# Patient Record
Sex: MALE | Race: BLACK OR AFRICAN AMERICAN | NOT HISPANIC OR LATINO | Employment: FULL TIME | URBAN - METROPOLITAN AREA
[De-identification: names, ages, dates, MRNs, and addresses within clinical notes are randomized per-mention and may not be internally consistent; named-entity substitution may affect disease eponyms.]

---

## 2019-03-04 ENCOUNTER — OFFICE VISIT (OUTPATIENT)
Dept: INTERNAL MEDICINE | Facility: MEDICAL CENTER | Age: 39
End: 2019-03-04
Payer: COMMERCIAL

## 2019-03-04 ENCOUNTER — HOSPITAL ENCOUNTER (OUTPATIENT)
Dept: LAB | Facility: MEDICAL CENTER | Age: 39
End: 2019-03-04
Attending: STUDENT IN AN ORGANIZED HEALTH CARE EDUCATION/TRAINING PROGRAM
Payer: COMMERCIAL

## 2019-03-04 VITALS
HEIGHT: 68 IN | TEMPERATURE: 98.1 F | SYSTOLIC BLOOD PRESSURE: 108 MMHG | HEART RATE: 68 BPM | BODY MASS INDEX: 28.89 KG/M2 | RESPIRATION RATE: 20 BRPM | WEIGHT: 190.6 LBS | OXYGEN SATURATION: 93 % | DIASTOLIC BLOOD PRESSURE: 60 MMHG

## 2019-03-04 DIAGNOSIS — Z00.00 ENCOUNTER FOR HEALTH MAINTENANCE EXAMINATION IN ADULT: ICD-10-CM

## 2019-03-04 DIAGNOSIS — Z57.9 MILD INTERMITTENT OCCUPATIONAL ASTHMA WITHOUT COMPLICATION: ICD-10-CM

## 2019-03-04 DIAGNOSIS — J45.20 MILD INTERMITTENT OCCUPATIONAL ASTHMA WITHOUT COMPLICATION: ICD-10-CM

## 2019-03-04 PROBLEM — J45.909 OCCUPATIONAL ASTHMA WITHOUT COMPLICATION: Status: ACTIVE | Noted: 2019-03-04

## 2019-03-04 LAB
ALBUMIN SERPL BCP-MCNC: 4.5 G/DL (ref 3.2–4.9)
ALBUMIN/GLOB SERPL: 1.4 G/DL
ALP SERPL-CCNC: 57 U/L (ref 30–99)
ALT SERPL-CCNC: 23 U/L (ref 2–50)
ANION GAP SERPL CALC-SCNC: 4 MMOL/L (ref 0–11.9)
AST SERPL-CCNC: 21 U/L (ref 12–45)
BILIRUB SERPL-MCNC: 0.5 MG/DL (ref 0.1–1.5)
BUN SERPL-MCNC: 16 MG/DL (ref 8–22)
CALCIUM SERPL-MCNC: 9.9 MG/DL (ref 8.5–10.5)
CHLORIDE SERPL-SCNC: 103 MMOL/L (ref 96–112)
CHOLEST SERPL-MCNC: 140 MG/DL (ref 100–199)
CO2 SERPL-SCNC: 29 MMOL/L (ref 20–33)
CREAT SERPL-MCNC: 1.17 MG/DL (ref 0.5–1.4)
ERYTHROCYTE [DISTWIDTH] IN BLOOD BY AUTOMATED COUNT: 42.2 FL (ref 35.9–50)
FASTING STATUS PATIENT QL REPORTED: NORMAL
GLOBULIN SER CALC-MCNC: 3.2 G/DL (ref 1.9–3.5)
GLUCOSE SERPL-MCNC: 82 MG/DL (ref 65–99)
HCT VFR BLD AUTO: 49.9 % (ref 42–52)
HDLC SERPL-MCNC: 49 MG/DL
HGB BLD-MCNC: 16.9 G/DL (ref 14–18)
LDLC SERPL CALC-MCNC: 80 MG/DL
MCH RBC QN AUTO: 31.5 PG (ref 27–33)
MCHC RBC AUTO-ENTMCNC: 33.9 G/DL (ref 33.7–35.3)
MCV RBC AUTO: 93.1 FL (ref 81.4–97.8)
PLATELET # BLD AUTO: 206 K/UL (ref 164–446)
PMV BLD AUTO: 9.9 FL (ref 9–12.9)
POTASSIUM SERPL-SCNC: 4.2 MMOL/L (ref 3.6–5.5)
PROT SERPL-MCNC: 7.7 G/DL (ref 6–8.2)
RBC # BLD AUTO: 5.36 M/UL (ref 4.7–6.1)
SODIUM SERPL-SCNC: 136 MMOL/L (ref 135–145)
TRIGL SERPL-MCNC: 55 MG/DL (ref 0–149)
TSH SERPL DL<=0.005 MIU/L-ACNC: 0.79 UIU/ML (ref 0.38–5.33)
WBC # BLD AUTO: 3.9 K/UL (ref 4.8–10.8)

## 2019-03-04 PROCEDURE — 80053 COMPREHEN METABOLIC PANEL: CPT

## 2019-03-04 PROCEDURE — 99204 OFFICE O/P NEW MOD 45 MIN: CPT | Mod: GC | Performed by: INTERNAL MEDICINE

## 2019-03-04 PROCEDURE — 36415 COLL VENOUS BLD VENIPUNCTURE: CPT

## 2019-03-04 PROCEDURE — 84443 ASSAY THYROID STIM HORMONE: CPT

## 2019-03-04 PROCEDURE — 85027 COMPLETE CBC AUTOMATED: CPT

## 2019-03-04 PROCEDURE — 80061 LIPID PANEL: CPT

## 2019-03-04 RX ORDER — ALBUTEROL SULFATE 90 UG/1
2 AEROSOL, METERED RESPIRATORY (INHALATION) EVERY 6 HOURS PRN
Qty: 8.5 G | Refills: 4 | Status: SHIPPED | OUTPATIENT
Start: 2019-03-04

## 2019-03-04 SDOH — HEALTH STABILITY - PHYSICAL HEALTH: OCCUPATIONAL EXPOSURE TO UNSPECIFIED RISK FACTOR: Z57.9

## 2019-03-04 NOTE — PATIENT INSTRUCTIONS
Asthma Attack Prevention  Although there is no way to prevent asthma from starting, you can take steps to control the disease and reduce its symptoms. Learn about your asthma and how to control it. Take an active role to control your asthma by working with your health care provider to create and follow an asthma action plan. An asthma action plan guides you in:  · Taking your medicines properly.  · Avoiding things that set off your asthma or make your asthma worse (asthma triggers).  · Tracking your level of asthma control.  · Responding to worsening asthma.  · Seeking emergency care when needed.  To track your asthma, keep records of your symptoms, check your peak flow number using a handheld device that shows how well air moves out of your lungs (peak flow meter), and get regular asthma checkups.   WHAT ARE SOME WAYS TO PREVENT AN ASTHMA ATTACK?  · Take medicines as directed by your health care provider.  · Keep track of your asthma symptoms and level of control.  · With your health care provider, write a detailed plan for taking medicines and managing an asthma attack. Then be sure to follow your action plan. Asthma is an ongoing condition that needs regular monitoring and treatment.  · Identify and avoid asthma triggers. Many outdoor allergens and irritants (such as pollen, mold, cold air, and air pollution) can trigger asthma attacks. Find out what your asthma triggers are and take steps to avoid them.  · Monitor your breathing. Learn to recognize warning signs of an attack, such as coughing, wheezing, or shortness of breath. Your lung function may decrease before you notice any signs or symptoms, so regularly measure and record your peak airflow with a home peak flow meter.  · Identify and treat attacks early. If you act quickly, you are less likely to have a severe attack. You will also need less medicine to control your symptoms. When your peak flow measurements decrease and alert you to an upcoming attack,  take your medicine as instructed and immediately stop any activity that may have triggered the attack. If your symptoms do not improve, get medical help.  · Pay attention to increasing quick-relief inhaler use. If you find yourself relying on your quick-relief inhaler, your asthma is not under control. See your health care provider about adjusting your treatment.  WHAT CAN MAKE MY SYMPTOMS WORSE?  A number of common things can set off or make your asthma symptoms worse and cause temporary increased inflammation of your airways. Keep track of your asthma symptoms for several weeks, detailing all the environmental and emotional factors that are linked with your asthma. When you have an asthma attack, go back to your asthma diary to see which factor, or combination of factors, might have contributed to it. Once you know what these factors are, you can take steps to control many of them. If you have allergies and asthma, it is important to take asthma prevention steps at home. Minimizing contact with the substance to which you are allergic will help prevent an asthma attack. Some triggers and ways to avoid these triggers are:  Animal Dander:   Some people are allergic to the flakes of skin or dried saliva from animals with fur or feathers.   · There is no such thing as a hypoallergenic dog or cat breed. All dogs or cats can cause allergies, even if they don't shed.  · Keep these pets out of your home.  · If you are not able to keep a pet outdoors, keep the pet out of your bedroom and other sleeping areas at all times, and keep the door closed.  · Remove carpets and furniture covered with cloth from your home. If that is not possible, keep the pet away from fabric-covered furniture and carpets.  Dust Mites:  Many people with asthma are allergic to dust mites. Dust mites are tiny bugs that are found in every home in mattresses, pillows, carpets, fabric-covered furniture, bedcovers, clothes, stuffed toys, and other  fabric-covered items.   · Cover your mattress in a special dust-proof cover.  · Cover your pillow in a special dust-proof cover, or wash the pillow each week in hot water. Water must be hotter than 130° F (54.4° C) to kill dust mites. Cold or warm water used with detergent and bleach can also be effective.  · Wash the sheets and blankets on your bed each week in hot water.  · Try not to sleep or lie on cloth-covered cushions.  · Call ahead when traveling and ask for a smoke-free hotel room. Bring your own bedding and pillows in case the hotel only supplies feather pillows and down comforters, which may contain dust mites and cause asthma symptoms.  · Remove carpets from your bedroom and those laid on concrete, if you can.  · Keep stuffed toys out of the bed, or wash the toys weekly in hot water or cooler water with detergent and bleach.  Cockroaches:  Many people with asthma are allergic to the droppings and remains of cockroaches.   · Keep food and garbage in closed containers. Never leave food out.  · Use poison baits, traps, powders, gels, or paste (for example, boric acid).  · If a spray is used to kill cockroaches, stay out of the room until the odor goes away.  Indoor Mold:  · Fix leaky faucets, pipes, or other sources of water that have mold around them.  · Clean floors and moldy surfaces with a fungicide or diluted bleach.  · Avoid using humidifiers, vaporizers, or swamp coolers. These can spread molds through the air.  Pollen and Outdoor Mold:  · When pollen or mold spore counts are high, try to keep your windows closed.  · Stay indoors with windows closed from late morning to afternoon. Pollen and some mold spore counts are highest at that time.  · Ask your health care provider whether you need to take anti-inflammatory medicine or increase your dose of the medicine before your allergy season starts.  Other Irritants to Avoid:  · Tobacco smoke is an irritant. If you smoke, ask your health care provider how  you can quit. Ask family members to quit smoking, too. Do not allow smoking in your home or car.  · If possible, do not use a wood-burning stove, kerosene heater, or fireplace. Minimize exposure to all sources of smoke, including incense, candles, fires, and fireworks.  · Try to stay away from strong odors and sprays, such as perfume, talcum powder, hair spray, and paints.  · Decrease humidity in your home and use an indoor air cleaning device. Reduce indoor humidity to below 60%. Dehumidifiers or central air conditioners can do this.  · Decrease house dust exposure by changing furnace and air cooler filters frequently.  · Try to have someone else vacuum for you once or twice a week. Stay out of rooms while they are being vacuumed and for a short while afterward.  · If you vacuum, use a dust mask from a hardware store, a double-layered or microfilter vacuum  bag, or a vacuum  with a HEPA filter.  · Sulfites in foods and beverages can be irritants. Do not drink beer or wine or eat dried fruit, processed potatoes, or shrimp if they cause asthma symptoms.  · Cold air can trigger an asthma attack. Cover your nose and mouth with a scarf on cold or windy days.  · Several health conditions can make asthma more difficult to manage, including a runny nose, sinus infections, reflux disease, psychological stress, and sleep apnea. Work with your health care provider to manage these conditions.  · Avoid close contact with people who have a respiratory infection such as a cold or the flu, since your asthma symptoms may get worse if you catch the infection. Wash your hands thoroughly after touching items that may have been handled by people with a respiratory infection.  · Get a flu shot every year to protect against the flu virus, which often makes asthma worse for days or weeks. Also get a pneumonia shot if you have not previously had one. Unlike the flu shot, the pneumonia shot does not need to be given  yearly.  Medicines:  · Talk to your health care provider about whether it is safe for you to take aspirin or non-steroidal anti-inflammatory medicines (NSAIDs). In a small number of people with asthma, aspirin and NSAIDs can cause asthma attacks. These medicines must be avoided by people who have known aspirin-sensitive asthma. It is important that people with aspirin-sensitive asthma read labels of all over-the-counter medicines used to treat pain, colds, coughs, and fever.  · Beta-blockers and ACE inhibitors are other medicines you should discuss with your health care provider.  HOW CAN I FIND OUT WHAT I AM ALLERGIC TO?  Ask your asthma health care provider about allergy skin testing or blood testing (the RAST test) to identify the allergens to which you are sensitive. If you are found to have allergies, the most important thing to do is to try to avoid exposure to any allergens that you are sensitive to as much as possible. Other treatments for allergies, such as medicines and allergy shots (immunotherapy) are available.   CAN I EXERCISE?  Follow your health care provider's advice regarding asthma treatment before exercising. It is important to maintain a regular exercise program, but vigorous exercise or exercise in cold, humid, or dry environments can cause asthma attacks, especially for those people who have exercise-induced asthma.     This information is not intended to replace advice given to you by your health care provider. Make sure you discuss any questions you have with your health care provider.     Document Released: 12/06/2010 Document Revised: 12/23/2014 Document Reviewed: 06/25/2014  [employer] Interactive Patient Education ©2016 [employer]

## 2019-03-04 NOTE — PROGRESS NOTES
New Patient to Establish    Reason to establish: New patient to establish    CC: Establish care and asthma-like symptoms    HPI: South is a 38-year-old male recently, patient works at the [employer].  Patient's wife is a nurse practitioner.  Patient has no past medical history but goes on to describe having stress-induced shortness of breath.  On further explanation, that patient describes that he develops shortness of breath particularly at work.  He also notices shortness of breath that develops around pattie environments or smoking.  Patient reports having one exacerbation once every 4 months.  Albuterol resolves all of his symptoms.  Patient has not had pulmonary function test recently.    Patient Active Problem List    Diagnosis Date Noted   • Occupational asthma without complication 03/04/2019       History reviewed. No pertinent past medical history.    Current Outpatient Prescriptions   Medication Sig Dispense Refill   • albuterol 108 (90 Base) MCG/ACT Aero Soln inhalation aerosol Inhale 2 Puffs by mouth every 6 hours as needed for Shortness of Breath. 8.5 g 4     No current facility-administered medications for this visit.        Allergies as of 03/04/2019   • (Not on File)       Social History     Social History   • Marital status:      Spouse name: N/A   • Number of children: N/A   • Years of education: N/A     Occupational History   • Not on file.     Social History Main Topics   • Smoking status: Never Smoker   • Smokeless tobacco: Never Used   • Alcohol use No   • Drug use: No   • Sexual activity: Yes     Partners: Female     Birth control/ protection: Implant     Other Topics Concern   • Not on file     Social History Narrative   • No narrative on file       Family History   Problem Relation Age of Onset   • Diabetes Father        History reviewed. No pertinent surgical history.    ROS: As per HPI. Additional pertinent systems as noted below.  Constitutional: Negative for chills and fever.  "  HENT: Negative for ear pain and sore throat.    Eyes: Negative for discharge and redness.   Respiratory: Negative for cough, hemoptysis, wheezing and stridor.    Cardiovascular: Negative for chest pain, palpitations and leg swelling.   Gastrointestinal: Negative for abdominal pain, constipation, diarrhea, heartburn, nausea and vomiting.   Genitourinary: Negative for dysuria, flank pain and hematuria.   Musculoskeletal: Negative for falls and myalgias.   Skin: Negative for itching and rash.   Neurological: Negative for dizziness, seizures, loss of consciousness and headaches.   Endo/Heme/Allergies: Negative for polydipsia. Does not bruise/bleed easily.   Psychiatric/Behavioral: Negative for substance abuse and suicidal ideas.       /60 (BP Location: Left arm, Patient Position: Sitting, BP Cuff Size: Adult)   Pulse 68   Temp 36.7 °C (98.1 °F) (Temporal)   Resp 20   Ht 1.735 m (5' 8.31\")   Wt 86.5 kg (190 lb 9.6 oz)   SpO2 93%   BMI 28.72 kg/m²     Physical Exam  General:  Alert and oriented, No apparent distress.    Eyes: Pupils equal and reactive. No scleral icterus.    Throat: Clear no erythema or exudates noted.    Neck: Supple. No lymphadenopathy noted. Thyroid not enlarged.    Lungs: Clear to auscultation and percussion bilaterally.    Cardiovascular: Regular rate and rhythm. No murmurs, rubs or gallops.    Abdomen:  Benign. No rebound or guarding noted.    Extremities: No clubbing, cyanosis, edema.    Skin: Clear. No rash or suspicious skin lesions noted.      Note: I have reviewed all pertinent labs and diagnostic tests associated with this visit with specific comments listed under the assessment and plan below    Assessment and Plan    1. Mild intermittent occupational asthma without complication  Description of patient's symptoms are consistent with occupational asthma.  Patient currently works at [employer].  Plan  - CBC WITHOUT DIFFERENTIAL; Future  - Comp Metabolic Panel; Future  - Lipid Profile; " Future  - TSH WITH REFLEX TO FT4; Future  - PULMONARY FUNCTION TESTS -Test requested: Bronchopulmonary Challenge Testing; Future    2. Encounter for health maintenance examination in adult  Patient is here to establish care, patient is up-to-date and all of his immunizations.  Plan  - CBC WITHOUT DIFFERENTIAL; Future  - Comp Metabolic Panel; Future  - Lipid Profile; Future  - TSH WITH REFLEX TO FT4; Future      Followup: Return in about 10 weeks (around 5/13/2019), or if symptoms worsen or fail to improve, for Short.    Signed by: Job Piper M.D.

## 2019-03-15 ENCOUNTER — TELEPHONE (OUTPATIENT)
Dept: PULMONOLOGY | Facility: HOSPICE | Age: 39
End: 2019-03-15

## 2019-03-15 DIAGNOSIS — J45.909 OCCUPATIONAL ASTHMA WITHOUT COMPLICATION, UNSPECIFIED ASTHMA SEVERITY: ICD-10-CM

## 2019-03-15 DIAGNOSIS — Z57.9 OCCUPATIONAL ASTHMA WITHOUT COMPLICATION, UNSPECIFIED ASTHMA SEVERITY: ICD-10-CM

## 2019-03-15 SDOH — HEALTH STABILITY - PHYSICAL HEALTH: OCCUPATIONAL EXPOSURE TO UNSPECIFIED RISK FACTOR: Z57.9

## 2019-03-15 NOTE — TELEPHONE ENCOUNTER
Dr. Piper, pt is scheduled for complete PFT 3/19/19. This test must be completed prior to scheduling Bronchopulmonary challenge testing.  Please enter an order for complete PFT.  Thanks for your time and attention, please call if you have any questions. Suly ext [phone].

## 2019-03-19 ENCOUNTER — APPOINTMENT (OUTPATIENT)
Dept: PULMONOLOGY | Facility: HOSPICE | Age: 39
End: 2019-03-19
Attending: STUDENT IN AN ORGANIZED HEALTH CARE EDUCATION/TRAINING PROGRAM
Payer: COMMERCIAL

## 2019-04-01 ENCOUNTER — NON-PROVIDER VISIT (OUTPATIENT)
Dept: PULMONOLOGY | Facility: HOSPICE | Age: 39
End: 2019-04-01
Attending: STUDENT IN AN ORGANIZED HEALTH CARE EDUCATION/TRAINING PROGRAM
Payer: COMMERCIAL

## 2019-04-01 VITALS — HEIGHT: 68 IN | WEIGHT: 191 LBS | BODY MASS INDEX: 28.95 KG/M2

## 2019-04-01 DIAGNOSIS — Z57.9 OCCUPATIONAL ASTHMA WITHOUT COMPLICATION, UNSPECIFIED ASTHMA SEVERITY: ICD-10-CM

## 2019-04-01 DIAGNOSIS — Z00.00 ENCOUNTER FOR HEALTH MAINTENANCE EXAMINATION IN ADULT: ICD-10-CM

## 2019-04-01 DIAGNOSIS — J45.20 MILD INTERMITTENT OCCUPATIONAL ASTHMA WITHOUT COMPLICATION: ICD-10-CM

## 2019-04-01 DIAGNOSIS — J45.909 OCCUPATIONAL ASTHMA WITHOUT COMPLICATION, UNSPECIFIED ASTHMA SEVERITY: ICD-10-CM

## 2019-04-01 DIAGNOSIS — Z57.9 MILD INTERMITTENT OCCUPATIONAL ASTHMA WITHOUT COMPLICATION: ICD-10-CM

## 2019-04-01 PROCEDURE — 94060 EVALUATION OF WHEEZING: CPT | Performed by: INTERNAL MEDICINE

## 2019-04-01 PROCEDURE — 94729 DIFFUSING CAPACITY: CPT | Performed by: INTERNAL MEDICINE

## 2019-04-01 PROCEDURE — 94726 PLETHYSMOGRAPHY LUNG VOLUMES: CPT | Performed by: INTERNAL MEDICINE

## 2019-04-01 SDOH — HEALTH STABILITY - PHYSICAL HEALTH: OCCUPATIONAL EXPOSURE TO UNSPECIFIED RISK FACTOR: Z57.9

## 2019-04-01 ASSESSMENT — PULMONARY FUNCTION TESTS
FVC: 4.46
FVC: 4.25
FEV1/FVC: 80
FEV1/FVC_PREDICTED: 82
FEV1/FVC_PERCENT_CHANGE: 6
FEV1/FVC_PERCENT_CHANGE: -25
FEV1/FVC: 85
FEV1_PERCENT_CHANGE: -4
FVC_PERCENT_PREDICTED: 103
FEV1/FVC_PERCENT_LLN: 68
FEV1_PERCENT_PREDICTED: 106
FEV1_PERCENT_PREDICTED: 107
FVC_PERCENT_PREDICTED: 108
FEV1_LLN: 2.82
FEV1/FVC_PERCENT_PREDICTED: 104
FEV1/FVC_PERCENT_PREDICTED: 104
FEV1/FVC: 85.41
FEV1_PERCENT_CHANGE: 1
FEV1_PREDICTED: 3.38
FEV1/FVC_PERCENT_PREDICTED: 98
FEV1: 3.63
FVC_LLN: 3.43
FEV1/FVC_PERCENT_PREDICTED: 97
FVC_PREDICTED: 4.11
FEV1/FVC_PERCENT_PREDICTED: 82
FEV1/FVC: 80
FEV1: 3.58

## 2019-04-01 NOTE — PROCEDURES
Technician: JULIANA Huitron    Technician Comment:  Good patient effort & cooperation.  The results of this test meet the ATS/ERS standards for acceptability & reproducibility.  Test was performed on the Nexi Body Plethysmograph-Elite DX system.  Predicted values were [facility]-3 for spirometry, [facility] for DLCO, ITS for Lung Volumes.  The DLCO was uncorrected for Hgb.  A bronchodilator of Ventolin HFA -2puffs via spacer administered.  DLCO performed during dilation period.    Interpretation:    Spirometry showed FVC of 4.25 L, 103% of predicted.  FEV1 was also normal at 3.63 L, 107% of predicted.  FEV1/FVC ratio was normal at 80%.  There was no significant response to bronchodilators.    Lung volumes showed normal residual volume at 103% of predicted and total lung capacity was also normal at 93% of predicted    Diffusion capacity was normal at 120% of predicted    Impression:  The patient had normal pulmonary function test.  Clinical correlation is required.

## 2019-05-13 ENCOUNTER — OFFICE VISIT (OUTPATIENT)
Dept: INTERNAL MEDICINE | Facility: MEDICAL CENTER | Age: 39
End: 2019-05-13
Payer: COMMERCIAL

## 2019-05-13 VITALS
OXYGEN SATURATION: 98 % | HEIGHT: 68 IN | TEMPERATURE: 98 F | SYSTOLIC BLOOD PRESSURE: 114 MMHG | HEART RATE: 67 BPM | WEIGHT: 188.25 LBS | BODY MASS INDEX: 28.53 KG/M2 | DIASTOLIC BLOOD PRESSURE: 64 MMHG

## 2019-05-13 DIAGNOSIS — J45.20 MILD INTERMITTENT OCCUPATIONAL ASTHMA WITHOUT COMPLICATION: ICD-10-CM

## 2019-05-13 DIAGNOSIS — Z57.9 MILD INTERMITTENT OCCUPATIONAL ASTHMA WITHOUT COMPLICATION: ICD-10-CM

## 2019-05-13 DIAGNOSIS — J45.990 EXERCISE-INDUCED ASTHMA: ICD-10-CM

## 2019-05-13 PROCEDURE — 99213 OFFICE O/P EST LOW 20 MIN: CPT | Mod: GE | Performed by: INTERNAL MEDICINE

## 2019-05-13 SDOH — HEALTH STABILITY - PHYSICAL HEALTH: OCCUPATIONAL EXPOSURE TO UNSPECIFIED RISK FACTOR: Z57.9

## 2019-05-13 ASSESSMENT — PATIENT HEALTH QUESTIONNAIRE - PHQ9: CLINICAL INTERPRETATION OF PHQ2 SCORE: 0

## 2019-05-13 NOTE — PATIENT INSTRUCTIONS
Exercise-Induced Bronchospasm  A bronchospasm is a condition that is commonly caused by exercise, in which the muscles around the bronchioles (airways to the lungs) tighten, causing the airway to constrict. Exercise-induced bronchospasms are usually associated with short periods of vigorous activity. Many people who experience an exercise-induced bronchospasm may not notice at the time of the event; however, the athlete may later experience symptoms that negatively affect training and performance.  SYMPTOMS   · High-pitched sounds with breathing (wheezing).  · Coughing.  · Increased work of breathing (dyspnea).  · Rapid breathing (hyperventilation).  · Chest pain.  · Symptoms occurring 4 to 6 hours after exercise is completed (late-phase reaction).  CAUSES   It is not known why certain individuals experience bronchospasms. Respiratory specialists currently think that the cool or dry air breathed in may cause damage to the lining of the bronchioles, which elicits an inflammatory response. The inflammation causes the airways to narrow and symptoms then occur.  RISK INCREASES WITH:  · Viral infections.  · Exercise in cold air.  · Exercise in dry conditions.  · Poor physical fitness.  · High-intensity exercise.  · No warm-up before play.  · Frequent exposure to substances that produce allergic reactions (allergens).  PREVENTION   · Improve conditioning.  · Treat allergies.  · Breathe warm air (cover mouth and nose with a towel or scarf).  · Warm up for an appropriate period of time before physical activity.  · Gradually decrease intensity (warm down) for an appropriate period of time after physical activity.  PROGNOSIS   Most people with exercise-induced asthma respond well to medication. Patients are typically prescribed an inhaler to treat bronchospasms. However, if symptoms persist despite treatment and continue to affect performance, individuals may need to consider avoiding activities that produce  symptoms.  RELATED COMPLICATIONS   · Decreased athletic performance.  · Inability to condition as well as expected.  · Side effects from medications.  TREATMENT   · Maintain physical fitness.  · Run with a scarf or towel over your mouth in cold, dry air.  · Complete at least 10 minutes of warm-up before high-intensity exercise.  · Warm down after play.  · Treat allergies.  MEDICATION  · The usual initial medication is an albuterol inhaler, which expands the constricted bronchioles.  · The second-line medication is inhaled corticosteroids, which reduce inflammation in the airway.  · Alternative medications included sodium cromoglycate and nedocromil inhalers.  · Long-acting medications such as salmeterol can also be used as second-line medications.  ACTIVITY   If medications are able to treat the offending symptoms, then no activity modification is required. If you know you will be training or competing in cold or dry climates take extra precautions to prevent symptoms.  DIET   No specific diet is recommended.   SEEK MEDICAL CARE IF:   · Greater than normal fatigue with exercise.  · Greater than normal difficulty breathing occurs with exercise.  · Increased wheezing with exercise.  · You appear to be breathing harder and faster than expected with training.  · Allergies appear to be uncontrollable.  · You experience chest pain with exercise.  This information is not intended to replace advice given to you by your health care provider. Make sure you discuss any questions you have with your health care provider.  Document Released: 12/18/2006 Document Revised: 01/08/2016 Document Reviewed: 08/24/2016  [employer] Interactive Patient Education © 2017 [employer]

## 2019-05-13 NOTE — PROGRESS NOTES
Established Patient    South presents today with the following:    CC: Follow-up for occupational asthma and exercise-induced shortness of breath    HPI: South is a 38-year-old male who presented initially complaining of shortness of breath, insulting agents were pattie conditions at his work.  Patient states that since then he no longer works in pattie conditions and his shortness of breath has improved.  Patient underwent PFTs which were normal.  Patient mentions not having any shortness of breath episodes except for one time when he ran during winter.  Patient was preparing for a 5K and went for a short run and noticed that he became short of breath, patient stated that this run was in the middle of winter.    Patient's lab results were reviewed with patient.  No abnormalities are noted.    Patient Active Problem List    Diagnosis Date Noted   • Occupational asthma without complication 03/04/2019       Current Outpatient Prescriptions   Medication Sig Dispense Refill   • albuterol 108 (90 Base) MCG/ACT Aero Soln inhalation aerosol Inhale 2 Puffs by mouth every 6 hours as needed for Shortness of Breath. (Patient not taking: Reported on 5/13/2019) 8.5 g 4     No current facility-administered medications for this visit.        ROS: As per HPI. Additional pertinent systems as noted below.  Constitutional: Negative for chills and fever.   HENT: Negative for ear pain and sore throat.    Eyes: Negative for discharge and redness.   Respiratory: Negative for cough, hemoptysis, wheezing and stridor.    Cardiovascular: Negative for chest pain, palpitations and leg swelling.   Gastrointestinal: Negative for abdominal pain, constipation, diarrhea, heartburn, nausea and vomiting.   Genitourinary: Negative for dysuria, flank pain and hematuria.   Musculoskeletal: Negative for falls and myalgias.   Skin: Negative for itching and rash.   Neurological: Negative for dizziness, seizures, loss of consciousness and headaches.  "  Endo/Heme/Allergies: Negative for polydipsia. Does not bruise/bleed easily.   Psychiatric/Behavioral: Negative for substance abuse and suicidal ideas.       /64 (BP Location: Left arm, Patient Position: Sitting, BP Cuff Size: Adult)   Pulse 67   Temp 36.7 °C (98 °F) (Temporal)   Ht 1.734 m (5' 8.25\")   Wt 85.4 kg (188 lb 4 oz)   SpO2 98%   BMI 28.41 kg/m²     Physical Exam   Constitutional:  oriented to person, place, and time. No distress.   Eyes: Pupils are equal, round, and reactive to light. No scleral icterus.  Neck: Neck supple. No thyromegaly present.   Cardiovascular: Normal rate, regular rhythm and normal heart sounds.  Exam reveals no gallop and no friction rub.  No murmur heard.  Pulmonary/Chest: Breath sounds normal. Chest wall is not dull to percussion.   Musculoskeletal:   no edema.   Lymphadenopathy: no cervical adenopathy  Neurological: alert and oriented to person, place, and time.   Skin: No cyanosis. Nails show no clubbing.      Note: I have reviewed all pertinent labs and diagnostic tests associated with this visit with specific comments listed under the assessment and plan below    Assessment and Plan    1. Mild intermittent occupational asthma without complication  2. Exercise-induced asthma  Patient was given handout on exercise-induced asthma, patient was also instructed to perform proper warm ups and if necessary to use albuterol prior to run or exercise.        Followup: Return in about 25 weeks (around 11/4/2019), or if symptoms worsen or fail to improve, for Long.      Signed by: Job Piper M.D.    "

## 2019-05-20 ENCOUNTER — OFFICE VISIT (OUTPATIENT)
Dept: URGENT CARE | Facility: CLINIC | Age: 39
End: 2019-05-20

## 2019-05-20 ENCOUNTER — NON-PROVIDER VISIT (OUTPATIENT)
Dept: URGENT CARE | Facility: CLINIC | Age: 39
End: 2019-05-20

## 2019-05-20 DIAGNOSIS — Z01.89 RESPIRATORY CLEARANCE EXAMINATION, ENCOUNTER FOR: ICD-10-CM

## 2019-05-20 PROCEDURE — 94375 RESPIRATORY FLOW VOLUME LOOP: CPT | Performed by: FAMILY MEDICINE

## 2019-05-20 PROCEDURE — 8916 PR CLEARANCE ONLY: Performed by: FAMILY MEDICINE

## 2019-05-20 NOTE — PROGRESS NOTES
South Sharp is a 38 y.o. male here for a non-provider visit for Mask Fit    If abnormal was an in office provider notified today (if so, indicate provider)? Yes  Routed to PCP? No

## 2019-05-20 NOTE — PROGRESS NOTES
South Sharp is a 38 y.o. male here for a non-provider visit for Resp. Clearance    If abnormal was an in office provider notified today (if so, indicate provider)? Yes  Routed to PCP? No

## 2022-08-18 ENCOUNTER — HOSPITAL ENCOUNTER (EMERGENCY)
Facility: MEDICAL CENTER | Age: 42
End: 2022-08-19
Attending: EMERGENCY MEDICINE
Payer: COMMERCIAL

## 2022-08-18 ENCOUNTER — APPOINTMENT (OUTPATIENT)
Dept: RADIOLOGY | Facility: MEDICAL CENTER | Age: 42
End: 2022-08-18
Attending: EMERGENCY MEDICINE
Payer: COMMERCIAL

## 2022-08-18 DIAGNOSIS — N20.1 URETEROLITHIASIS: ICD-10-CM

## 2022-08-18 DIAGNOSIS — K38.9 APPENDICOLITH: ICD-10-CM

## 2022-08-18 DIAGNOSIS — K46.9 ABDOMINAL HERNIA WITHOUT OBSTRUCTION AND WITHOUT GANGRENE, RECURRENCE NOT SPECIFIED, UNSPECIFIED HERNIA TYPE: ICD-10-CM

## 2022-08-18 DIAGNOSIS — R16.0 HEPATOMEGALY: ICD-10-CM

## 2022-08-18 LAB
ALBUMIN SERPL BCP-MCNC: 4.3 G/DL (ref 3.2–4.9)
ALBUMIN/GLOB SERPL: 1.3 G/DL
ALP SERPL-CCNC: 59 U/L (ref 30–99)
ALT SERPL-CCNC: 19 U/L (ref 2–50)
ANION GAP SERPL CALC-SCNC: 13 MMOL/L (ref 7–16)
APPEARANCE UR: ABNORMAL
AST SERPL-CCNC: 19 U/L (ref 12–45)
BACTERIA #/AREA URNS HPF: NEGATIVE /HPF
BASOPHILS # BLD AUTO: 0.8 % (ref 0–1.8)
BASOPHILS # BLD: 0.04 K/UL (ref 0–0.12)
BILIRUB SERPL-MCNC: 0.2 MG/DL (ref 0.1–1.5)
BILIRUB UR QL STRIP.AUTO: NEGATIVE
BUN SERPL-MCNC: 15 MG/DL (ref 8–22)
CALCIUM SERPL-MCNC: 9.2 MG/DL (ref 8.5–10.5)
CHLORIDE SERPL-SCNC: 106 MMOL/L (ref 96–112)
CO2 SERPL-SCNC: 21 MMOL/L (ref 20–33)
COLOR UR: YELLOW
CREAT SERPL-MCNC: 1.52 MG/DL (ref 0.5–1.4)
EOSINOPHIL # BLD AUTO: 0.27 K/UL (ref 0–0.51)
EOSINOPHIL NFR BLD: 5.3 % (ref 0–6.9)
EPI CELLS #/AREA URNS HPF: NEGATIVE /HPF
ERYTHROCYTE [DISTWIDTH] IN BLOOD BY AUTOMATED COUNT: 40.3 FL (ref 35.9–50)
GFR SERPLBLD CREATININE-BSD FMLA CKD-EPI: 58 ML/MIN/1.73 M 2
GLOBULIN SER CALC-MCNC: 3.3 G/DL (ref 1.9–3.5)
GLUCOSE SERPL-MCNC: 117 MG/DL (ref 65–99)
GLUCOSE UR STRIP.AUTO-MCNC: NEGATIVE MG/DL
HCT VFR BLD AUTO: 44.8 % (ref 42–52)
HGB BLD-MCNC: 15.8 G/DL (ref 14–18)
HYALINE CASTS #/AREA URNS LPF: ABNORMAL /LPF
IMM GRANULOCYTES # BLD AUTO: 0.01 K/UL (ref 0–0.11)
IMM GRANULOCYTES NFR BLD AUTO: 0.2 % (ref 0–0.9)
KETONES UR STRIP.AUTO-MCNC: 15 MG/DL
LEUKOCYTE ESTERASE UR QL STRIP.AUTO: ABNORMAL
LIPASE SERPL-CCNC: 32 U/L (ref 11–82)
LYMPHOCYTES # BLD AUTO: 2.57 K/UL (ref 1–4.8)
LYMPHOCYTES NFR BLD: 50.4 % (ref 22–41)
MCH RBC QN AUTO: 31.3 PG (ref 27–33)
MCHC RBC AUTO-ENTMCNC: 35.3 G/DL (ref 33.7–35.3)
MCV RBC AUTO: 88.7 FL (ref 81.4–97.8)
MICRO URNS: ABNORMAL
MONOCYTES # BLD AUTO: 0.46 K/UL (ref 0–0.85)
MONOCYTES NFR BLD AUTO: 9 % (ref 0–13.4)
NEUTROPHILS # BLD AUTO: 1.75 K/UL (ref 1.82–7.42)
NEUTROPHILS NFR BLD: 34.3 % (ref 44–72)
NITRITE UR QL STRIP.AUTO: NEGATIVE
NRBC # BLD AUTO: 0 K/UL
NRBC BLD-RTO: 0 /100 WBC
PH UR STRIP.AUTO: 6.5 [PH] (ref 5–8)
PLATELET # BLD AUTO: 193 K/UL (ref 164–446)
PMV BLD AUTO: 9.6 FL (ref 9–12.9)
POTASSIUM SERPL-SCNC: 4 MMOL/L (ref 3.6–5.5)
PROT SERPL-MCNC: 7.6 G/DL (ref 6–8.2)
PROT UR QL STRIP: NEGATIVE MG/DL
RBC # BLD AUTO: 5.05 M/UL (ref 4.7–6.1)
RBC # URNS HPF: >150 /HPF
RBC UR QL AUTO: ABNORMAL
SODIUM SERPL-SCNC: 140 MMOL/L (ref 135–145)
SP GR UR STRIP.AUTO: 1.03
UROBILINOGEN UR STRIP.AUTO-MCNC: 1 MG/DL
WBC # BLD AUTO: 5.1 K/UL (ref 4.8–10.8)
WBC #/AREA URNS HPF: ABNORMAL /HPF

## 2022-08-18 PROCEDURE — 700111 HCHG RX REV CODE 636 W/ 250 OVERRIDE (IP): Performed by: EMERGENCY MEDICINE

## 2022-08-18 PROCEDURE — 83690 ASSAY OF LIPASE: CPT

## 2022-08-18 PROCEDURE — 80053 COMPREHEN METABOLIC PANEL: CPT

## 2022-08-18 PROCEDURE — 96375 TX/PRO/DX INJ NEW DRUG ADDON: CPT

## 2022-08-18 PROCEDURE — 85025 COMPLETE CBC W/AUTO DIFF WBC: CPT

## 2022-08-18 PROCEDURE — 700111 HCHG RX REV CODE 636 W/ 250 OVERRIDE (IP)

## 2022-08-18 PROCEDURE — 99285 EMERGENCY DEPT VISIT HI MDM: CPT

## 2022-08-18 PROCEDURE — 96374 THER/PROPH/DIAG INJ IV PUSH: CPT

## 2022-08-18 PROCEDURE — 700105 HCHG RX REV CODE 258: Performed by: EMERGENCY MEDICINE

## 2022-08-18 PROCEDURE — 36415 COLL VENOUS BLD VENIPUNCTURE: CPT

## 2022-08-18 PROCEDURE — 81001 URINALYSIS AUTO W/SCOPE: CPT

## 2022-08-18 RX ORDER — ONDANSETRON 4 MG/1
4 TABLET, ORALLY DISINTEGRATING ORAL ONCE
Status: COMPLETED | OUTPATIENT
Start: 2022-08-18 | End: 2022-08-18

## 2022-08-18 RX ORDER — KETOROLAC TROMETHAMINE 30 MG/ML
15 INJECTION, SOLUTION INTRAMUSCULAR; INTRAVENOUS ONCE
Status: COMPLETED | OUTPATIENT
Start: 2022-08-18 | End: 2022-08-18

## 2022-08-18 RX ORDER — SODIUM CHLORIDE 9 MG/ML
1000 INJECTION, SOLUTION INTRAVENOUS ONCE
Status: COMPLETED | OUTPATIENT
Start: 2022-08-18 | End: 2022-08-19

## 2022-08-18 RX ORDER — MORPHINE SULFATE 4 MG/ML
4 INJECTION INTRAVENOUS ONCE
Status: COMPLETED | OUTPATIENT
Start: 2022-08-18 | End: 2022-08-18

## 2022-08-18 RX ADMIN — SODIUM CHLORIDE 1000 ML: 9 INJECTION, SOLUTION INTRAVENOUS at 23:15

## 2022-08-18 RX ADMIN — ONDANSETRON 4 MG: 4 TABLET, ORALLY DISINTEGRATING ORAL at 20:48

## 2022-08-18 RX ADMIN — KETOROLAC TROMETHAMINE 15 MG: 30 INJECTION, SOLUTION INTRAMUSCULAR at 23:15

## 2022-08-18 RX ADMIN — MORPHINE SULFATE 4 MG: 4 INJECTION INTRAVENOUS at 23:15

## 2022-08-18 NOTE — LETTER
South Sharp was seen and treated in our emergency department on 8/18/2022.  He may return to work on Wednesday August 24, 2022.      If you have any questions or concerns, please don't hesitate to call.      Michelle Fischer RN

## 2022-08-19 VITALS
HEIGHT: 68 IN | OXYGEN SATURATION: 97 % | HEART RATE: 70 BPM | WEIGHT: 190 LBS | TEMPERATURE: 98.6 F | SYSTOLIC BLOOD PRESSURE: 114 MMHG | RESPIRATION RATE: 15 BRPM | DIASTOLIC BLOOD PRESSURE: 66 MMHG | BODY MASS INDEX: 28.79 KG/M2

## 2022-08-19 PROCEDURE — 74176 CT ABD & PELVIS W/O CONTRAST: CPT

## 2022-08-19 PROCEDURE — 700102 HCHG RX REV CODE 250 W/ 637 OVERRIDE(OP): Performed by: EMERGENCY MEDICINE

## 2022-08-19 PROCEDURE — A9270 NON-COVERED ITEM OR SERVICE: HCPCS | Performed by: EMERGENCY MEDICINE

## 2022-08-19 RX ORDER — OXYCODONE HYDROCHLORIDE 5 MG/1
5 TABLET ORAL EVERY 6 HOURS PRN
Qty: 16 TABLET | Refills: 0 | Status: SHIPPED | OUTPATIENT
Start: 2022-08-19 | End: 2022-08-23

## 2022-08-19 RX ORDER — TAMSULOSIN HYDROCHLORIDE 0.4 MG/1
0.4 CAPSULE ORAL DAILY
Qty: 30 CAPSULE | Refills: 0 | Status: SHIPPED | OUTPATIENT
Start: 2022-08-19

## 2022-08-19 RX ORDER — OXYCODONE HYDROCHLORIDE 5 MG/1
5 TABLET ORAL ONCE
Status: COMPLETED | OUTPATIENT
Start: 2022-08-19 | End: 2022-08-19

## 2022-08-19 RX ORDER — ONDANSETRON 4 MG/1
4 TABLET, ORALLY DISINTEGRATING ORAL EVERY 6 HOURS PRN
Qty: 10 TABLET | Refills: 0 | Status: SHIPPED | OUTPATIENT
Start: 2022-08-19

## 2022-08-19 RX ADMIN — OXYCODONE 5 MG: 5 TABLET ORAL at 02:14

## 2022-08-19 NOTE — ED NOTES
Pt educated on need for urine. Wife at bedside is a family NP. Pt connected to monitor and POC updated

## 2022-08-19 NOTE — ED NOTES
Pt d/c in stable condition and ambulates to lobby with wife with a steady gait. IV removed and pt education on how/when to take Rxs, and when to follow up with urology. No questions at time of d/c

## 2022-08-19 NOTE — ED NOTES
Pt having multiple episodes of emesis in triage, pt wife requesting medication. Pt given oral zofran per MAR.

## 2022-08-19 NOTE — ED NOTES
PT vomiting in triage. PT family member approached triage staff asking for medication. Triage RN made aware.

## 2022-08-19 NOTE — DISCHARGE INSTRUCTIONS
You were seen in the ER for abdominal and flank pain that is due to a kidney stone.  Thankfully your urine is not infected.  Your kidney function is very slightly low which is likely due to both the kidney stone and dehydration after your vomiting.  We gave you fluids through an IV and this should improve with continued oral hydration as we discussed.  I would like you to drink at least 8 ounces of clear liquids every hour in order to help you pass the kidney stone and improve your kidney function.  I have sent in prescriptions for pain and nausea medication as well as Flomax which should help you pass the stones.  Please do not drink alcohol or drive after taking the pain medication as it will make you sleepy.  We have provided you with a urine strainer so that you can try to catch the kidney stones and take them to the urologist for evaluation.  They can then tell you how to try to prevent kidney stones in the future.  I would like you to take Motrin as well as Tylenol for pain control.  Please call the urologist's office today to make a follow-up appointment.  You should also contact your primary care physician for recheck of your creatinine (which is a measure of your kidney function) within the next week.  The CT scan showed some incidental findings which can be followed up with your PCP. These findings include an enlarged liver, an area of hardened stool outside your appendix, and a hernia. The hernia should be seen by a general surgeon to discuss possible operative management. I have given you our on call general surgeon's contact information above. Return immediately to the ER with any new or worsening symptoms.  I hope you feel better soon!

## 2022-08-19 NOTE — ED TRIAGE NOTES
"Chief Complaint   Patient presents with    Abdominal Pain     Pt c/o RLQ onset yesterday with pain reaching 10/10 today that radiates to the R flank. Pt also endorses n/v. (+) appendix and gallbladder.        Wheelchair to triage for above complaint.   Educated on triage process, encourage to inform staff of any changes.     /85   Pulse 71   Temp 37 °C (98.6 °F) (Oral)   Resp 16   Ht 1.727 m (5' 8\")   Wt 86.2 kg (190 lb)   SpO2 100% Comment: RA  BMI 28.89 kg/m²     "

## 2022-08-23 ENCOUNTER — HOSPITAL ENCOUNTER (OUTPATIENT)
Facility: MEDICAL CENTER | Age: 42
End: 2022-08-23
Attending: PHYSICIAN ASSISTANT
Payer: COMMERCIAL

## 2022-08-23 PROCEDURE — 82365 CALCULUS SPECTROSCOPY: CPT

## 2022-08-26 LAB
APPEARANCE STONE: NORMAL
COMPN STONE: NORMAL
SPECIMEN WT: 11 MG

## 2022-12-07 ENCOUNTER — HOSPITAL ENCOUNTER (OUTPATIENT)
Facility: MEDICAL CENTER | Age: 42
End: 2022-12-07
Attending: PHYSICIAN ASSISTANT
Payer: COMMERCIAL

## 2022-12-07 LAB
ALBUMIN SERPL BCP-MCNC: 4.4 G/DL (ref 3.2–4.9)
ALBUMIN/GLOB SERPL: 1.3 G/DL
ALP SERPL-CCNC: 64 U/L (ref 30–99)
ALT SERPL-CCNC: 15 U/L (ref 2–50)
ANION GAP SERPL CALC-SCNC: 10 MMOL/L (ref 7–16)
AST SERPL-CCNC: 20 U/L (ref 12–45)
BILIRUB SERPL-MCNC: 0.3 MG/DL (ref 0.1–1.5)
BUN SERPL-MCNC: 14 MG/DL (ref 8–22)
CALCIUM SERPL-MCNC: 9.6 MG/DL (ref 8.5–10.5)
CHLORIDE SERPL-SCNC: 106 MMOL/L (ref 96–112)
CO2 SERPL-SCNC: 26 MMOL/L (ref 20–33)
CREAT SERPL-MCNC: 1.26 MG/DL (ref 0.5–1.4)
GFR SERPLBLD CREATININE-BSD FMLA CKD-EPI: 73 ML/MIN/1.73 M 2
GLOBULIN SER CALC-MCNC: 3.4 G/DL (ref 1.9–3.5)
GLUCOSE SERPL-MCNC: 77 MG/DL (ref 65–99)
POTASSIUM SERPL-SCNC: 4.2 MMOL/L (ref 3.6–5.5)
PROT SERPL-MCNC: 7.8 G/DL (ref 6–8.2)
PTH-INTACT SERPL-MCNC: 47.4 PG/ML (ref 14–72)
SODIUM SERPL-SCNC: 142 MMOL/L (ref 135–145)
URATE SERPL-MCNC: 7.8 MG/DL (ref 2.5–8.3)

## 2022-12-07 PROCEDURE — 80053 COMPREHEN METABOLIC PANEL: CPT

## 2022-12-07 PROCEDURE — 83970 ASSAY OF PARATHORMONE: CPT

## 2022-12-07 PROCEDURE — 84550 ASSAY OF BLOOD/URIC ACID: CPT

## 2023-01-13 ENCOUNTER — HOSPITAL ENCOUNTER (OUTPATIENT)
Dept: RADIOLOGY | Facility: MEDICAL CENTER | Age: 43
End: 2023-01-13
Attending: PHYSICIAN ASSISTANT
Payer: COMMERCIAL

## 2023-01-13 DIAGNOSIS — N20.0 CALCULUS OF KIDNEY: ICD-10-CM

## 2023-01-13 PROCEDURE — 74018 RADEX ABDOMEN 1 VIEW: CPT

## 2023-06-12 NOTE — ED PROVIDER NOTES
"Chief Complaint  Back Injury (Pt states that he hurt his back this am and it is hard to walk )    Subjective    Patient report twisting to reach for something this morning and back locked up. Has had this happen in the past and improved with muscle relaxer and rest. Denies urinary symptoms.         Andrés Layne presents to [facility]  Back Injury  This is a recurrent problem. The current episode started today. The problem has been unchanged. Associated symptoms include arthralgias. Pertinent negatives include no chills. The symptoms are aggravated by bending and twisting. He has tried rest and NSAIDs for the symptoms. The treatment provided mild relief.     Objective   Vital Signs:  /84 (BP Location: Left arm, Patient Position: Sitting, Cuff Size: Adult)   Pulse 59   Ht 200.7 cm (79\")   Wt 112 kg (247 lb 12.8 oz)   SpO2 99% Comment: room air  BMI 27.92 kg/m²   Estimated body mass index is 27.92 kg/m² as calculated from the following:    Height as of this encounter: 200.7 cm (79\").    Weight as of this encounter: 112 kg (247 lb 12.8 oz).             Physical Exam  HENT:      Head: Normocephalic.   Cardiovascular:      Rate and Rhythm: Normal rate.   Pulmonary:      Effort: Pulmonary effort is normal.   Musculoskeletal:      Lumbar back: Spasms and tenderness present. Decreased range of motion.   Skin:     General: Skin is warm and dry.   Neurological:      Mental Status: He is alert and oriented to person, place, and time.   Psychiatric:         Mood and Affect: Mood normal.      Result Review :          Results for orders placed or performed in visit on 06/12/23   POCT urinalysis dipstick, automated    Specimen: Urine   Result Value Ref Range    Color Yellow Yellow, Straw, Dark Yellow, Aslly    Clarity, UA Clear Clear    Specific Gravity  1.005 1.005 - 1.030    pH, Urine 6.0 5.0 - 8.0    Leukocytes Negative Negative    Nitrite, UA Negative Negative    Protein, POC " "ED Provider Note    CHIEF COMPLAINT  Chief Complaint   Patient presents with    Abdominal Pain     Pt c/o RLQ onset yesterday with pain reaching 10/10 today that radiates to the R flank. Pt also endorses n/v. (+) appendix and gallbladder.        HPI  South Sharp is a 42 y.o. male who presents with a chief complaint of right lower quadrant and right-sided flank pain that started today at around 4 PM.  He reports associated nausea and vomiting.  He tried over-the-counter pain medication without improvement.  He initially had no dysuria or hematuria but in the waiting room he did have an episode of both.  He is circumcised.  He has never had a kidney stone in the past.    REVIEW OF SYSTEMS  See HPI for further details.  Right-sided flank pain.  Right lower quadrant pain.  Hematuria.  Dysuria.  All other systems are negative.     PAST MEDICAL HISTORY       SOCIAL HISTORY  Social History     Tobacco Use    Smoking status: Never    Smokeless tobacco: Never   Substance and Sexual Activity    Alcohol use: No    Drug use: No    Sexual activity: Yes     Partners: Female     Birth control/protection: Implant       SURGICAL HISTORY  patient denies any surgical history    CURRENT MEDICATIONS  Home Medications       Reviewed by Rosa Malhotra R.N. (Registered Nurse) on 08/18/22 at 2001  Med List Status: Not Addressed     Medication Last Dose Status   albuterol 108 (90 Base) MCG/ACT Aero Soln inhalation aerosol  Active                    ALLERGIES  No Known Allergies    PHYSICAL EXAM  VITAL SIGNS: /85   Pulse 71   Temp 37 °C (98.6 °F) (Oral)   Resp 16   Ht 1.727 m (5' 8\")   Wt 86.2 kg (190 lb)   SpO2 100% Comment: RA  BMI 28.89 kg/m²    Pulse ox interpretation: I interpret this pulse ox as normal.  Constitutional: Alert, appears uncomfortable.  HENT: No signs of trauma, Bilateral external ears normal, Nose normal.  Dry mucous membranes.  Eyes: Pupils are equal and reactive, Conjunctiva normal, Non-icteric. "   Neck: Normal range of motion, No tenderness, Supple, No stridor.   Lymphatic: No lymphadenopathy noted.   Cardiovascular: Regular rate and rhythm, no murmurs. Pulses symmetrical.  Thorax & Lungs: Normal breath sounds, No respiratory distress, No wheezing.   Abdomen: Bowel sounds normal, Soft, right lower quadrant tenderness, No masses, No pulsatile masses. No peritoneal signs.  Skin: Warm, Dry, No erythema, No rash.   Back: No bony tenderness, right CVA tenderness.   Extremities: No cyanosis.  Musculoskeletal: No major deformities noted.   Neurologic: Alert, No focal deficits noted.   Psychiatric: Affect normal, Judgment normal, Mood normal.     DIAGNOSTIC STUDIES / PROCEDURES    LABS  Results for orders placed or performed during the hospital encounter of 08/18/22   CBC WITH DIFFERENTIAL   Result Value Ref Range    WBC 5.1 4.8 - 10.8 K/uL    RBC 5.05 4.70 - 6.10 M/uL    Hemoglobin 15.8 14.0 - 18.0 g/dL    Hematocrit 44.8 42.0 - 52.0 %    MCV 88.7 81.4 - 97.8 fL    MCH 31.3 27.0 - 33.0 pg    MCHC 35.3 33.7 - 35.3 g/dL    RDW 40.3 35.9 - 50.0 fL    Platelet Count 193 164 - 446 K/uL    MPV 9.6 9.0 - 12.9 fL    Neutrophils-Polys 34.30 (L) 44.00 - 72.00 %    Lymphocytes 50.40 (H) 22.00 - 41.00 %    Monocytes 9.00 0.00 - 13.40 %    Eosinophils 5.30 0.00 - 6.90 %    Basophils 0.80 0.00 - 1.80 %    Immature Granulocytes 0.20 0.00 - 0.90 %    Nucleated RBC 0.00 /100 WBC    Neutrophils (Absolute) 1.75 (L) 1.82 - 7.42 K/uL    Lymphs (Absolute) 2.57 1.00 - 4.80 K/uL    Monos (Absolute) 0.46 0.00 - 0.85 K/uL    Eos (Absolute) 0.27 0.00 - 0.51 K/uL    Baso (Absolute) 0.04 0.00 - 0.12 K/uL    Immature Granulocytes (abs) 0.01 0.00 - 0.11 K/uL    NRBC (Absolute) 0.00 K/uL   COMP METABOLIC PANEL   Result Value Ref Range    Sodium 140 135 - 145 mmol/L    Potassium 4.0 3.6 - 5.5 mmol/L    Chloride 106 96 - 112 mmol/L    Co2 21 20 - 33 mmol/L    Anion Gap 13.0 7.0 - 16.0    Glucose 117 (H) 65 - 99 mg/dL    Bun 15 8 - 22 mg/dL     Negative Negative mg/dL    Glucose, UA Negative Negative mg/dL    Ketones, UA Negative Negative    Urobilinogen, UA Normal Normal, 0.2 E.U./dL    Bilirubin Negative Negative    Blood, UA Negative Negative    Lot Number [id number]     Expiration Date 04-30-24               Assessment and Plan   Diagnoses and all orders for this visit:    1. Acute midline low back pain without sciatica (Primary)  Comments:  Handout on low back exercises, rest, alternate ice and heat, tylenol as needed, F/U in 2 weeks for possible imaging and PT referral  Orders:  -     cyclobenzaprine (FLEXERIL) 10 MG tablet; Take 1 tablet by mouth 3 (Three) Times a Day As Needed for Muscle Spasms.  Dispense: 30 tablet; Refill: 1  -     POCT urinalysis dipstick, automated    2. Elevated blood pressure reading in office without diagnosis of hypertension  Comments:  Likely related to pain, monitor at home and notify PCP of continued elevation             Follow Up   Return if symptoms worsen or fail to improve.  Patient was given instructions and counseling regarding his condition or for health maintenance advice. Please see specific information pulled into the AVS if appropriate.          Creatinine 1.52 (H) 0.50 - 1.40 mg/dL    Calcium 9.2 8.5 - 10.5 mg/dL    AST(SGOT) 19 12 - 45 U/L    ALT(SGPT) 19 2 - 50 U/L    Alkaline Phosphatase 59 30 - 99 U/L    Total Bilirubin 0.2 0.1 - 1.5 mg/dL    Albumin 4.3 3.2 - 4.9 g/dL    Total Protein 7.6 6.0 - 8.2 g/dL    Globulin 3.3 1.9 - 3.5 g/dL    A-G Ratio 1.3 g/dL   LIPASE   Result Value Ref Range    Lipase 32 11 - 82 U/L   URINALYSIS    Specimen: Urine   Result Value Ref Range    Color Yellow     Character Cloudy (A)     Specific Gravity 1.027 <1.035    Ph 6.5 5.0 - 8.0    Glucose Negative Negative mg/dL    Ketones 15 (A) Negative mg/dL    Protein Negative Negative mg/dL    Bilirubin Negative Negative    Urobilinogen, Urine 1.0 Negative    Nitrite Negative Negative    Leukocyte Esterase Trace (A) Negative    Occult Blood Large (A) Negative    Micro Urine Req Microscopic    ESTIMATED GFR   Result Value Ref Range    GFR (CKD-EPI) 58 (A) >60 mL/min/1.73 m 2   URINE MICROSCOPIC (W/UA)   Result Value Ref Range    WBC 0-2 (A) /hpf    RBC >150 (A) /hpf    Bacteria Negative None /hpf    Epithelial Cells Negative /hpf    Hyaline Cast 0-2 /lpf     RADIOLOGY  CT-RENAL COLIC EVALUATION(A/P W/O)   Final Result         1.  Bilateral nephrolithiasis, 4.5 mm mid right ureteral stone results in right urinary outflow tract obstructive changes. A second 3 mm stone at the right ureterovesicular junction is seen.   2.  Appendicolith, otherwise normal-appearing appendix, appendicolith places patient at increased risk for future development of appendicitis.   3.  Midline lower abdominal fat-containing hernia   4.  Hepatomegaly        COURSE & MEDICAL DECISION MAKING  Pertinent Labs & Imaging studies reviewed. (See chart for details)  This is a 42 year old male here with RLQ and right flank pain with associated hematuria, nausea, and vomiting most likely due to ureterolithiasis. He is AF with normal VS. He appears dehydrated but non toxic. Exam reveals RLQ tenderness with R  CVAT.    Patient started on IV fluids as well as pain and nausea medication with improvement in symptoms.    CBC is without leukocytosis or anemia. Metabolic panel demonstrates normal electrolytes. Blood glucose is slightly elevated to 117. Creatinine is slightly high at 1.52. This is likely at least partially due to dehydration. UA reveals blood but does not suggest infection.    CT demonstrates two right sided ureteral stones measuring 3mm and 4.5mm. There is mild hydronephrosis.     Patient reevaluated at bedside. He is resting comfortably. His pain is about a 2/10 and he is comfortable with plan for discharge and follow up with urology. He was given a dose of oral pain medication prior to d/c. He was given a prescription for pain and nausea medication as well as Flomax. I have asked him to increase his PO clear fluid intake to at least 8oz every hour. He will take tylenol and ibuprofen as well. He was provided with a urine strainer. He was given contact information for urology. He will follow up with his PMD within one week to have a recheck of his creatinine. Discharged in good and stable condition with strict return precautions.    The patient will not drink alcohol nor drive with prescribed medications. The patient will return for worsening symptoms and is stable at the time of discharge. The patient verbalizes understanding and will comply.    In prescribing controlled substances to this patient, I certify that I have obtained and reviewed the medical history of South Sharp. I have also made a good jovana effort to obtain applicable records from other providers who have treated the patient and records did not demonstrate any increased risk of substance abuse that would prevent me from prescribing controlled substances.     I have conducted a physical exam and documented it. I have reviewed Mr. Sharp’s prescription history as maintained by the [place] Prescription Monitoring Program.     I have assessed the  patient’s risk for abuse, dependency, and addiction using the validated Opioid Risk Tool available at https://www.mdcalc.com/nlovbs-uish-vhnh-ort-narcotic-abuse.     Given the above, I believe the benefits of controlled substance therapy outweigh the risks. The reasons for prescribing controlled substances include non-narcotic, oral analgesic alternatives have been inadequate for pain control. Accordingly, I have discussed the risk and benefits, treatment plan, and alternative therapies with the patient.       HYDRATION  HYDRATION: Based on the patient's presentation of Dehydration the patient was given IV fluids. IV Hydration was used because oral hydration was not adequate alone. Upon recheck following hydration, the patient was improved.    FINAL IMPRESSION  1. Ureterolithiasis  oxyCODONE immediate-release (ROXICODONE) 5 MG Tab    ondansetron (ZOFRAN ODT) 4 MG TABLET DISPERSIBLE    tamsulosin (FLOMAX) 0.4 MG capsule      2. Hepatomegaly        3. Appendicolith        4. Abdominal hernia without obstruction and without gangrene, recurrence not specified, unspecified hernia type          Electronically signed by: Baltazar Peoples M.D., 8/18/2022 10:36 PM

## 2023-06-14 ENCOUNTER — HOSPITAL ENCOUNTER (OUTPATIENT)
Dept: RADIOLOGY | Facility: MEDICAL CENTER | Age: 43
End: 2023-06-14
Attending: PHYSICIAN ASSISTANT
Payer: COMMERCIAL

## 2023-06-14 DIAGNOSIS — N20.0 CALCULUS OF KIDNEY: ICD-10-CM

## 2023-06-14 PROCEDURE — 74018 RADEX ABDOMEN 1 VIEW: CPT

## 2024-04-23 ENCOUNTER — ANESTHESIA (OUTPATIENT)
Dept: SURGERY | Facility: MEDICAL CENTER | Age: 44
DRG: 660 | End: 2024-04-23
Payer: COMMERCIAL

## 2024-04-23 ENCOUNTER — HOSPITAL ENCOUNTER (INPATIENT)
Facility: MEDICAL CENTER | Age: 44
LOS: 1 days | DRG: 660 | End: 2024-04-24
Attending: EMERGENCY MEDICINE | Admitting: FAMILY MEDICINE
Payer: COMMERCIAL

## 2024-04-23 ENCOUNTER — APPOINTMENT (OUTPATIENT)
Dept: RADIOLOGY | Facility: MEDICAL CENTER | Age: 44
DRG: 660 | End: 2024-04-23
Attending: EMERGENCY MEDICINE
Payer: COMMERCIAL

## 2024-04-23 ENCOUNTER — APPOINTMENT (OUTPATIENT)
Dept: RADIOLOGY | Facility: MEDICAL CENTER | Age: 44
DRG: 660 | End: 2024-04-23
Attending: UROLOGY
Payer: COMMERCIAL

## 2024-04-23 ENCOUNTER — ANESTHESIA EVENT (OUTPATIENT)
Dept: SURGERY | Facility: MEDICAL CENTER | Age: 44
DRG: 660 | End: 2024-04-23
Payer: COMMERCIAL

## 2024-04-23 DIAGNOSIS — N20.0 NEPHROLITHIASIS: ICD-10-CM

## 2024-04-23 DIAGNOSIS — N13.9 OBSTRUCTIVE UROPATHY: ICD-10-CM

## 2024-04-23 DIAGNOSIS — R10.9 FLANK PAIN: ICD-10-CM

## 2024-04-23 PROBLEM — K76.0 FATTY LIVER: Status: ACTIVE | Noted: 2024-04-23

## 2024-04-23 LAB
ALBUMIN SERPL BCP-MCNC: 4.2 G/DL (ref 3.2–4.9)
ALBUMIN/GLOB SERPL: 1.3 G/DL
ALP SERPL-CCNC: 47 U/L (ref 30–99)
ALT SERPL-CCNC: 13 U/L (ref 2–50)
ANION GAP SERPL CALC-SCNC: 13 MMOL/L (ref 7–16)
APPEARANCE UR: CLEAR
AST SERPL-CCNC: 16 U/L (ref 12–45)
BASOPHILS # BLD AUTO: 0.5 % (ref 0–1.8)
BASOPHILS # BLD: 0.03 K/UL (ref 0–0.12)
BILIRUB SERPL-MCNC: 0.5 MG/DL (ref 0.1–1.5)
BILIRUB UR QL STRIP.AUTO: NEGATIVE
BUN SERPL-MCNC: 13 MG/DL (ref 8–22)
CALCIUM ALBUM COR SERPL-MCNC: 8.8 MG/DL (ref 8.5–10.5)
CALCIUM SERPL-MCNC: 9 MG/DL (ref 8.5–10.5)
CHLORIDE SERPL-SCNC: 101 MMOL/L (ref 96–112)
CO2 SERPL-SCNC: 21 MMOL/L (ref 20–33)
COLOR UR: YELLOW
CREAT SERPL-MCNC: 2.21 MG/DL (ref 0.5–1.4)
EOSINOPHIL # BLD AUTO: 0.06 K/UL (ref 0–0.51)
EOSINOPHIL NFR BLD: 1 % (ref 0–6.9)
ERYTHROCYTE [DISTWIDTH] IN BLOOD BY AUTOMATED COUNT: 39.3 FL (ref 35.9–50)
GFR SERPLBLD CREATININE-BSD FMLA CKD-EPI: 37 ML/MIN/1.73 M 2
GLOBULIN SER CALC-MCNC: 3.3 G/DL (ref 1.9–3.5)
GLUCOSE SERPL-MCNC: 92 MG/DL (ref 65–99)
GLUCOSE UR STRIP.AUTO-MCNC: NEGATIVE MG/DL
HCT VFR BLD AUTO: 43.1 % (ref 42–52)
HGB BLD-MCNC: 15.4 G/DL (ref 14–18)
IMM GRANULOCYTES # BLD AUTO: 0.01 K/UL (ref 0–0.11)
IMM GRANULOCYTES NFR BLD AUTO: 0.2 % (ref 0–0.9)
KETONES UR STRIP.AUTO-MCNC: ABNORMAL MG/DL
LEUKOCYTE ESTERASE UR QL STRIP.AUTO: NEGATIVE
LYMPHOCYTES # BLD AUTO: 1.81 K/UL (ref 1–4.8)
LYMPHOCYTES NFR BLD: 30.8 % (ref 22–41)
MCH RBC QN AUTO: 31 PG (ref 27–33)
MCHC RBC AUTO-ENTMCNC: 35.7 G/DL (ref 32.3–36.5)
MCV RBC AUTO: 86.9 FL (ref 81.4–97.8)
MICRO URNS: ABNORMAL
MONOCYTES # BLD AUTO: 0.68 K/UL (ref 0–0.85)
MONOCYTES NFR BLD AUTO: 11.6 % (ref 0–13.4)
NEUTROPHILS # BLD AUTO: 3.28 K/UL (ref 1.82–7.42)
NEUTROPHILS NFR BLD: 55.9 % (ref 44–72)
NITRITE UR QL STRIP.AUTO: NEGATIVE
NRBC # BLD AUTO: 0 K/UL
NRBC BLD-RTO: 0 /100 WBC (ref 0–0.2)
PH UR STRIP.AUTO: 5.5 [PH] (ref 5–8)
PLATELET # BLD AUTO: 166 K/UL (ref 164–446)
PMV BLD AUTO: 9.2 FL (ref 9–12.9)
POTASSIUM SERPL-SCNC: 4.1 MMOL/L (ref 3.6–5.5)
PROT SERPL-MCNC: 7.5 G/DL (ref 6–8.2)
PROT UR QL STRIP: NEGATIVE MG/DL
RBC # BLD AUTO: 4.96 M/UL (ref 4.7–6.1)
RBC UR QL AUTO: NEGATIVE
SODIUM SERPL-SCNC: 135 MMOL/L (ref 135–145)
SP GR UR STRIP.AUTO: 1.02
UROBILINOGEN UR STRIP.AUTO-MCNC: 0.2 MG/DL
WBC # BLD AUTO: 5.9 K/UL (ref 4.8–10.8)

## 2024-04-23 PROCEDURE — 99285 EMERGENCY DEPT VISIT HI MDM: CPT

## 2024-04-23 PROCEDURE — 700111 HCHG RX REV CODE 636 W/ 250 OVERRIDE (IP): Performed by: ANESTHESIOLOGY

## 2024-04-23 PROCEDURE — 160048 HCHG OR STATISTICAL LEVEL 1-5: Performed by: UROLOGY

## 2024-04-23 PROCEDURE — 700105 HCHG RX REV CODE 258

## 2024-04-23 PROCEDURE — 36415 COLL VENOUS BLD VENIPUNCTURE: CPT

## 2024-04-23 PROCEDURE — 700102 HCHG RX REV CODE 250 W/ 637 OVERRIDE(OP)

## 2024-04-23 PROCEDURE — 160035 HCHG PACU - 1ST 60 MINS PHASE I: Performed by: UROLOGY

## 2024-04-23 PROCEDURE — C2617 STENT, NON-COR, TEM W/O DEL: HCPCS | Performed by: UROLOGY

## 2024-04-23 PROCEDURE — 700101 HCHG RX REV CODE 250: Performed by: ANESTHESIOLOGY

## 2024-04-23 PROCEDURE — 74018 RADEX ABDOMEN 1 VIEW: CPT

## 2024-04-23 PROCEDURE — A9270 NON-COVERED ITEM OR SERVICE: HCPCS

## 2024-04-23 PROCEDURE — 85025 COMPLETE CBC W/AUTO DIFF WBC: CPT

## 2024-04-23 PROCEDURE — C1769 GUIDE WIRE: HCPCS | Performed by: UROLOGY

## 2024-04-23 PROCEDURE — 81003 URINALYSIS AUTO W/O SCOPE: CPT

## 2024-04-23 PROCEDURE — C1747 HCHG SHELL REV 278 C1747: HCPCS | Performed by: UROLOGY

## 2024-04-23 PROCEDURE — A9270 NON-COVERED ITEM OR SERVICE: HCPCS | Performed by: ANESTHESIOLOGY

## 2024-04-23 PROCEDURE — 700102 HCHG RX REV CODE 250 W/ 637 OVERRIDE(OP): Performed by: ANESTHESIOLOGY

## 2024-04-23 PROCEDURE — 160002 HCHG RECOVERY MINUTES (STAT): Performed by: UROLOGY

## 2024-04-23 PROCEDURE — 160039 HCHG SURGERY MINUTES - EA ADDL 1 MIN LEVEL 3: Performed by: UROLOGY

## 2024-04-23 PROCEDURE — 700105 HCHG RX REV CODE 258: Performed by: ANESTHESIOLOGY

## 2024-04-23 PROCEDURE — 74176 CT ABD & PELVIS W/O CONTRAST: CPT

## 2024-04-23 PROCEDURE — 80053 COMPREHEN METABOLIC PANEL: CPT

## 2024-04-23 PROCEDURE — 96374 THER/PROPH/DIAG INJ IV PUSH: CPT

## 2024-04-23 PROCEDURE — 160028 HCHG SURGERY MINUTES - 1ST 30 MINS LEVEL 3: Performed by: UROLOGY

## 2024-04-23 PROCEDURE — 700111 HCHG RX REV CODE 636 W/ 250 OVERRIDE (IP): Mod: JZ | Performed by: EMERGENCY MEDICINE

## 2024-04-23 PROCEDURE — 160009 HCHG ANES TIME/MIN: Performed by: UROLOGY

## 2024-04-23 PROCEDURE — C1894 INTRO/SHEATH, NON-LASER: HCPCS | Performed by: UROLOGY

## 2024-04-23 PROCEDURE — 96376 TX/PRO/DX INJ SAME DRUG ADON: CPT

## 2024-04-23 PROCEDURE — 770006 HCHG ROOM/CARE - MED/SURG/GYN SEMI*

## 2024-04-23 DEVICE — STENT UROLOGICAL POLARIS 6X26  ULTRA: Type: IMPLANTABLE DEVICE | Status: FUNCTIONAL

## 2024-04-23 RX ORDER — OXYCODONE HYDROCHLORIDE 5 MG/1
5 TABLET ORAL
Status: DISCONTINUED | OUTPATIENT
Start: 2024-04-23 | End: 2024-04-24 | Stop reason: HOSPADM

## 2024-04-23 RX ORDER — TAMSULOSIN HYDROCHLORIDE 0.4 MG/1
0.4 CAPSULE ORAL DAILY
Status: DISCONTINUED | OUTPATIENT
Start: 2024-04-24 | End: 2024-04-24 | Stop reason: HOSPADM

## 2024-04-23 RX ORDER — AMOXICILLIN 250 MG
2 CAPSULE ORAL EVERY EVENING
Status: DISCONTINUED | OUTPATIENT
Start: 2024-04-23 | End: 2024-04-24 | Stop reason: HOSPADM

## 2024-04-23 RX ORDER — ONDANSETRON 2 MG/ML
4 INJECTION INTRAMUSCULAR; INTRAVENOUS
Status: DISCONTINUED | OUTPATIENT
Start: 2024-04-23 | End: 2024-04-23 | Stop reason: HOSPADM

## 2024-04-23 RX ORDER — LIDOCAINE HYDROCHLORIDE 20 MG/ML
INJECTION, SOLUTION EPIDURAL; INFILTRATION; INTRACAUDAL; PERINEURAL PRN
Status: DISCONTINUED | OUTPATIENT
Start: 2024-04-23 | End: 2024-04-23 | Stop reason: SURG

## 2024-04-23 RX ORDER — LIDOCAINE HYDROCHLORIDE 40 MG/ML
SOLUTION TOPICAL PRN
Status: DISCONTINUED | OUTPATIENT
Start: 2024-04-23 | End: 2024-04-23 | Stop reason: SURG

## 2024-04-23 RX ORDER — OXYCODONE HCL 5 MG/5 ML
5 SOLUTION, ORAL ORAL
Status: COMPLETED | OUTPATIENT
Start: 2024-04-23 | End: 2024-04-23

## 2024-04-23 RX ORDER — HYDROMORPHONE HYDROCHLORIDE 1 MG/ML
0.4 INJECTION, SOLUTION INTRAMUSCULAR; INTRAVENOUS; SUBCUTANEOUS
Status: DISCONTINUED | OUTPATIENT
Start: 2024-04-23 | End: 2024-04-23 | Stop reason: HOSPADM

## 2024-04-23 RX ORDER — LABETALOL HYDROCHLORIDE 5 MG/ML
5 INJECTION, SOLUTION INTRAVENOUS
Status: DISCONTINUED | OUTPATIENT
Start: 2024-04-23 | End: 2024-04-23 | Stop reason: HOSPADM

## 2024-04-23 RX ORDER — MIDAZOLAM HYDROCHLORIDE 1 MG/ML
INJECTION INTRAMUSCULAR; INTRAVENOUS PRN
Status: DISCONTINUED | OUTPATIENT
Start: 2024-04-23 | End: 2024-04-23 | Stop reason: SURG

## 2024-04-23 RX ORDER — SODIUM CHLORIDE, SODIUM LACTATE, POTASSIUM CHLORIDE, CALCIUM CHLORIDE 600; 310; 30; 20 MG/100ML; MG/100ML; MG/100ML; MG/100ML
INJECTION, SOLUTION INTRAVENOUS
Status: DISCONTINUED | OUTPATIENT
Start: 2024-04-23 | End: 2024-04-23 | Stop reason: SURG

## 2024-04-23 RX ORDER — CEFAZOLIN SODIUM 1 G/3ML
INJECTION, POWDER, FOR SOLUTION INTRAMUSCULAR; INTRAVENOUS PRN
Status: DISCONTINUED | OUTPATIENT
Start: 2024-04-23 | End: 2024-04-23 | Stop reason: SURG

## 2024-04-23 RX ORDER — HYDROMORPHONE HYDROCHLORIDE 1 MG/ML
0.1 INJECTION, SOLUTION INTRAMUSCULAR; INTRAVENOUS; SUBCUTANEOUS
Status: DISCONTINUED | OUTPATIENT
Start: 2024-04-23 | End: 2024-04-23 | Stop reason: HOSPADM

## 2024-04-23 RX ORDER — PROMETHAZINE HYDROCHLORIDE 12.5 MG/1
12.5-25 SUPPOSITORY RECTAL EVERY 4 HOURS PRN
Status: DISCONTINUED | OUTPATIENT
Start: 2024-04-23 | End: 2024-04-24 | Stop reason: HOSPADM

## 2024-04-23 RX ORDER — OXYCODONE HCL 5 MG/5 ML
10 SOLUTION, ORAL ORAL
Status: COMPLETED | OUTPATIENT
Start: 2024-04-23 | End: 2024-04-23

## 2024-04-23 RX ORDER — OXYCODONE HYDROCHLORIDE 5 MG/1
5 TABLET ORAL EVERY 6 HOURS PRN
COMMUNITY
Start: 2024-04-20

## 2024-04-23 RX ORDER — DIPHENHYDRAMINE HYDROCHLORIDE 50 MG/ML
12.5 INJECTION INTRAMUSCULAR; INTRAVENOUS
Status: DISCONTINUED | OUTPATIENT
Start: 2024-04-23 | End: 2024-04-23 | Stop reason: HOSPADM

## 2024-04-23 RX ORDER — DEXAMETHASONE SODIUM PHOSPHATE 4 MG/ML
INJECTION, SOLUTION INTRA-ARTICULAR; INTRALESIONAL; INTRAMUSCULAR; INTRAVENOUS; SOFT TISSUE PRN
Status: DISCONTINUED | OUTPATIENT
Start: 2024-04-23 | End: 2024-04-23 | Stop reason: SURG

## 2024-04-23 RX ORDER — HYDROMORPHONE HYDROCHLORIDE 1 MG/ML
0.25 INJECTION, SOLUTION INTRAMUSCULAR; INTRAVENOUS; SUBCUTANEOUS
Status: DISCONTINUED | OUTPATIENT
Start: 2024-04-23 | End: 2024-04-24 | Stop reason: HOSPADM

## 2024-04-23 RX ORDER — PROCHLORPERAZINE EDISYLATE 5 MG/ML
5-10 INJECTION INTRAMUSCULAR; INTRAVENOUS EVERY 4 HOURS PRN
Status: DISCONTINUED | OUTPATIENT
Start: 2024-04-23 | End: 2024-04-24 | Stop reason: HOSPADM

## 2024-04-23 RX ORDER — SODIUM CHLORIDE, SODIUM LACTATE, POTASSIUM CHLORIDE, CALCIUM CHLORIDE 600; 310; 30; 20 MG/100ML; MG/100ML; MG/100ML; MG/100ML
INJECTION, SOLUTION INTRAVENOUS CONTINUOUS
Status: DISCONTINUED | OUTPATIENT
Start: 2024-04-23 | End: 2024-04-23 | Stop reason: HOSPADM

## 2024-04-23 RX ORDER — ONDANSETRON 2 MG/ML
4 INJECTION INTRAMUSCULAR; INTRAVENOUS EVERY 4 HOURS PRN
Status: DISCONTINUED | OUTPATIENT
Start: 2024-04-23 | End: 2024-04-24 | Stop reason: HOSPADM

## 2024-04-23 RX ORDER — ONDANSETRON 4 MG/1
4 TABLET, ORALLY DISINTEGRATING ORAL EVERY 4 HOURS PRN
Status: DISCONTINUED | OUTPATIENT
Start: 2024-04-23 | End: 2024-04-24 | Stop reason: HOSPADM

## 2024-04-23 RX ORDER — SODIUM CHLORIDE 9 MG/ML
INJECTION, SOLUTION INTRAVENOUS CONTINUOUS
Status: DISCONTINUED | OUTPATIENT
Start: 2024-04-23 | End: 2024-04-23

## 2024-04-23 RX ORDER — HYDRALAZINE HYDROCHLORIDE 20 MG/ML
5 INJECTION INTRAMUSCULAR; INTRAVENOUS
Status: DISCONTINUED | OUTPATIENT
Start: 2024-04-23 | End: 2024-04-23 | Stop reason: HOSPADM

## 2024-04-23 RX ORDER — ALBUTEROL SULFATE 90 UG/1
2 AEROSOL, METERED RESPIRATORY (INHALATION) EVERY 6 HOURS PRN
Status: DISCONTINUED | OUTPATIENT
Start: 2024-04-23 | End: 2024-04-24 | Stop reason: HOSPADM

## 2024-04-23 RX ORDER — HYDROMORPHONE HYDROCHLORIDE 1 MG/ML
0.2 INJECTION, SOLUTION INTRAMUSCULAR; INTRAVENOUS; SUBCUTANEOUS
Status: DISCONTINUED | OUTPATIENT
Start: 2024-04-23 | End: 2024-04-23 | Stop reason: HOSPADM

## 2024-04-23 RX ORDER — PROMETHAZINE HYDROCHLORIDE 25 MG/1
12.5-25 TABLET ORAL EVERY 4 HOURS PRN
Status: DISCONTINUED | OUTPATIENT
Start: 2024-04-23 | End: 2024-04-24 | Stop reason: HOSPADM

## 2024-04-23 RX ORDER — OXYCODONE HYDROCHLORIDE 5 MG/1
2.5 TABLET ORAL
Status: DISCONTINUED | OUTPATIENT
Start: 2024-04-23 | End: 2024-04-24 | Stop reason: HOSPADM

## 2024-04-23 RX ORDER — ROCURONIUM BROMIDE 10 MG/ML
INJECTION, SOLUTION INTRAVENOUS PRN
Status: DISCONTINUED | OUTPATIENT
Start: 2024-04-23 | End: 2024-04-23 | Stop reason: SURG

## 2024-04-23 RX ORDER — ONDANSETRON 2 MG/ML
INJECTION INTRAMUSCULAR; INTRAVENOUS PRN
Status: DISCONTINUED | OUTPATIENT
Start: 2024-04-23 | End: 2024-04-23 | Stop reason: SURG

## 2024-04-23 RX ORDER — POLYETHYLENE GLYCOL 3350 17 G/17G
1 POWDER, FOR SOLUTION ORAL
Status: DISCONTINUED | OUTPATIENT
Start: 2024-04-23 | End: 2024-04-24 | Stop reason: HOSPADM

## 2024-04-23 RX ORDER — ACETAMINOPHEN 500 MG
1000 TABLET ORAL ONCE
Status: COMPLETED | OUTPATIENT
Start: 2024-04-23 | End: 2024-04-23

## 2024-04-23 RX ORDER — HALOPERIDOL 5 MG/ML
1 INJECTION INTRAMUSCULAR
Status: DISCONTINUED | OUTPATIENT
Start: 2024-04-23 | End: 2024-04-23 | Stop reason: HOSPADM

## 2024-04-23 RX ADMIN — LIDOCAINE HYDROCHLORIDE 4 ML: 40 SOLUTION TOPICAL at 17:02

## 2024-04-23 RX ADMIN — FENTANYL CITRATE 50 MCG: 50 INJECTION, SOLUTION INTRAMUSCULAR; INTRAVENOUS at 17:33

## 2024-04-23 RX ADMIN — MIDAZOLAM HYDROCHLORIDE 1 MG: 1 INJECTION, SOLUTION INTRAMUSCULAR; INTRAVENOUS at 16:54

## 2024-04-23 RX ADMIN — OXYCODONE HYDROCHLORIDE 10 MG: 5 SOLUTION ORAL at 18:04

## 2024-04-23 RX ADMIN — LIDOCAINE HYDROCHLORIDE 60 MG: 20 INJECTION, SOLUTION EPIDURAL; INFILTRATION; INTRACAUDAL at 16:55

## 2024-04-23 RX ADMIN — ROCURONIUM BROMIDE 50 MG: 50 INJECTION, SOLUTION INTRAVENOUS at 16:55

## 2024-04-23 RX ADMIN — ACETAMINOPHEN 1000 MG: 500 TABLET, FILM COATED ORAL at 18:06

## 2024-04-23 RX ADMIN — ONDANSETRON 4 MG: 2 INJECTION INTRAMUSCULAR; INTRAVENOUS at 17:10

## 2024-04-23 RX ADMIN — SUGAMMADEX 200 MG: 100 INJECTION, SOLUTION INTRAVENOUS at 17:28

## 2024-04-23 RX ADMIN — CEFAZOLIN 2 G: 1 INJECTION, POWDER, FOR SOLUTION INTRAMUSCULAR; INTRAVENOUS at 17:00

## 2024-04-23 RX ADMIN — FENTANYL CITRATE 50 MCG: 50 INJECTION, SOLUTION INTRAMUSCULAR; INTRAVENOUS at 12:50

## 2024-04-23 RX ADMIN — FENTANYL CITRATE 50 MCG: 50 INJECTION, SOLUTION INTRAMUSCULAR; INTRAVENOUS at 11:16

## 2024-04-23 RX ADMIN — DEXAMETHASONE SODIUM PHOSPHATE 8 MG: 4 INJECTION INTRA-ARTICULAR; INTRALESIONAL; INTRAMUSCULAR; INTRAVENOUS; SOFT TISSUE at 17:10

## 2024-04-23 RX ADMIN — SODIUM CHLORIDE, POTASSIUM CHLORIDE, SODIUM LACTATE AND CALCIUM CHLORIDE: 600; 310; 30; 20 INJECTION, SOLUTION INTRAVENOUS at 16:49

## 2024-04-23 RX ADMIN — OXYCODONE HYDROCHLORIDE 5 MG: 5 TABLET ORAL at 14:15

## 2024-04-23 RX ADMIN — PROPOFOL 200 MG: 10 INJECTION, EMULSION INTRAVENOUS at 16:55

## 2024-04-23 RX ADMIN — FENTANYL CITRATE 50 MCG: 50 INJECTION, SOLUTION INTRAMUSCULAR; INTRAVENOUS at 16:55

## 2024-04-23 RX ADMIN — SODIUM CHLORIDE: 9 INJECTION, SOLUTION INTRAVENOUS at 19:54

## 2024-04-23 ASSESSMENT — LIFESTYLE VARIABLES
TOTAL SCORE: 0
ON A TYPICAL DAY WHEN YOU DRINK ALCOHOL HOW MANY DRINKS DO YOU HAVE: 0
AVERAGE NUMBER OF DAYS PER WEEK YOU HAVE A DRINK CONTAINING ALCOHOL: 0
EVER FELT BAD OR GUILTY ABOUT YOUR DRINKING: NO
CONSUMPTION TOTAL: NEGATIVE
TOTAL SCORE: 0
HOW MANY TIMES IN THE PAST YEAR HAVE YOU HAD 5 OR MORE DRINKS IN A DAY: 0
TOTAL SCORE: 0
DOES PATIENT WANT TO STOP DRINKING: NO
HAVE PEOPLE ANNOYED YOU BY CRITICIZING YOUR DRINKING: NO
ALCOHOL_USE: NO
EVER HAD A DRINK FIRST THING IN THE MORNING TO STEADY YOUR NERVES TO GET RID OF A HANGOVER: NO
HAVE YOU EVER FELT YOU SHOULD CUT DOWN ON YOUR DRINKING: NO

## 2024-04-23 ASSESSMENT — COGNITIVE AND FUNCTIONAL STATUS - GENERAL
MOBILITY SCORE: 24
DAILY ACTIVITIY SCORE: 24
SUGGESTED CMS G CODE MODIFIER MOBILITY: CH
SUGGESTED CMS G CODE MODIFIER DAILY ACTIVITY: CH

## 2024-04-23 ASSESSMENT — PAIN DESCRIPTION - PAIN TYPE
TYPE: SURGICAL PAIN
TYPE: ACUTE PAIN
TYPE: ACUTE PAIN
TYPE: SURGICAL PAIN
TYPE: ACUTE PAIN
TYPE: SURGICAL PAIN
TYPE: ACUTE PAIN
TYPE: ACUTE PAIN

## 2024-04-23 ASSESSMENT — PATIENT HEALTH QUESTIONNAIRE - PHQ9
1. LITTLE INTEREST OR PLEASURE IN DOING THINGS: NOT AT ALL
2. FEELING DOWN, DEPRESSED, IRRITABLE, OR HOPELESS: NOT AT ALL
SUM OF ALL RESPONSES TO PHQ9 QUESTIONS 1 AND 2: 0

## 2024-04-23 ASSESSMENT — FIBROSIS 4 INDEX
FIB4 SCORE: 1.15

## 2024-04-23 ASSESSMENT — PAIN SCALES - GENERAL: PAIN_LEVEL: 4

## 2024-04-23 NOTE — ED PROVIDER NOTES
ED Provider Note    Primary care provider: Job Piper M.D. (Inactive)  Means of arrival: private vehicle  History obtained from: patient  History limited by: none    CHIEF COMPLAINT  Chief Complaint   Patient presents with    Painful Urination    Abdominal Pain     Pt diagnosed with 6mm kidney stoke to L  side on Saturday at .   Pt reports taking prescribed antibiotics however pain is unbearable. Wanting to discuss alternate options due to pain.        HPI/ROS  South Sharp is a 43 y.o. male who presents to the Emergency Department for evaluation of left flank pain.  Patient reports on 4/20/2024 he started to develop left flank pain that felt similar to prior kidney stones.  He was seen at outside facility [facility] and was diagnosed with a 6 mm kidney stone.  He was started on oxycodone, Flomax and Zofran for management of symptoms but has continued to have persistent flank pain.  This morning he woke up with severe left-sided sharp flank pain and nausea.  He took his medications and reports his nausea has subsided but the pain has persisted.  Therefore he came in for further evaluation.  Denies fevers, chills.  Prior abdominal surgical history notable for hernia repair and testicular torsion.  Patient last ate at 9 AM this morning he had a banana and some water    EXTERNAL RECORDS REVIEWED  Patient last followed with urologparam Palafox on 11/30/2023.  Per that note, he was previously seen at [facility] on 8/19/22 for kidney stones.  Patient was able to pass his stones without surgical intervention and was monitored by urologparam Reynoso.    LIMITATION TO HISTORY   none    OUTSIDE HISTORIAN(S):  none      PAST MEDICAL HISTORY   History of kidney stones    SURGICAL HISTORY  patient denies any surgical history    SOCIAL HISTORY  Social History     Tobacco Use    Smoking status: Never    Smokeless tobacco: Never   Substance Use Topics    Alcohol use: No    Drug use: No      Social  "History     Substance and Sexual Activity   Drug Use No       FAMILY HISTORY  Family History   Problem Relation Age of Onset    Diabetes Father        CURRENT MEDICATIONS  Home Medications       Reviewed by Keven Felder (Pharmacy Tech) on 04/23/24 at 1300  Med List Status: <None>     Medication Last Dose Status   albuterol 108 (90 Base) MCG/ACT Aero Soln inhalation aerosol PRN Active   ondansetron (ZOFRAN ODT) 4 MG TABLET DISPERSIBLE 4/23/2024 Active   tamsulosin (FLOMAX) 0.4 MG capsule 4/23/2024 Active                    ALLERGIES  No Known Allergies    PHYSICAL EXAM  VITAL SIGNS: /72   Pulse 61   Temp 36.6 °C (97.9 °F) (Temporal)   Resp 16   Ht 1.753 m (5' 9\")   Wt 88.5 kg (195 lb 1.7 oz)   SpO2 96%   BMI 28.81 kg/m²   Vitals reviewed by myself.  Physical Exam  Nursing note and vitals reviewed.  Constitutional: Well-developed and well-nourished.  Patient appears uncomfortable  HENT: Head is normocephalic and atraumatic.  Eyes: extra-ocular movements intact  Cardiovascular: Regular rate and  regular rhythm. No murmur heard.  Pulmonary/Chest: Breath sounds normal. No wheezes or rales.   Abdominal: Soft and non-tender. No distention.  Mild CVA tenderness on left side, no right-sided CVA tenderness  Musculoskeletal: Extremities exhibit normal range of motion without edema or tenderness.   Neurological: Awake and alert  Skin: Skin is warm and dry. No rash.         DIAGNOSTIC STUDIES:  LABS  Labs Reviewed   URINALYSIS,CULTURE IF INDICATED - Abnormal; Notable for the following components:       Result Value    Ketones Trace (*)     All other components within normal limits   COMP METABOLIC PANEL - Abnormal; Notable for the following components:    Creatinine 2.21 (*)     All other components within normal limits   ESTIMATED GFR - Abnormal; Notable for the following components:    GFR (CKD-EPI) 37 (*)     All other components within normal limits   CBC WITH DIFFERENTIAL       All labs reviewed and " independently interpreted by myself    RADIOLOGY  Images independently interpreted by myself prior to radiologist review:  -Large mid ureter stone with persistent contrast in the left kidney likely from scan at outside facility on 4/20/2024  Final interpretation by radiology demonstrates:    CT-RENAL COLIC EVALUATION(A/P W/O)   Final Result         1. There is a 7 mm stone in the mid left ureter with mild left-sided hydroureteronephrosis.   2. Mild fatty infiltration of liver.   3. Small right renal calculi.   4. The patient has undergone interval ventral abdominal wall hernia repair.        The radiologist's interpretation of all radiological studies have been reviewed by me.        COURSE & MEDICAL DECISION MAKING    ED OBS: No; Patient does not meet criteria for ED Observation.     INITIAL ASSESSMENT, ED COURSE AND PLAN    Patient is a 43-year-old male who comes in for evaluation of persistent left-sided flank pain.  Differential diagnosis includes nephrolithiasis, obstructive uropathy, infected kidney stone, JUAN CARLOS.  Diagnostic workup includes labs, urinalysis and renal CT.    Patient's initial vitals are within normal limits.  He appears uncomfortable and is therefore treated with fentanyl.  After treatment patient does have improvement in his symptoms.  Labs returned and there is no leukocytosis, urinalysis negative for infection.  Electrolytes are within normal limits.  Patient does have obstructive uropathy with creatinine of 2.21 and GFR of 37.  No underlying history of renal disease.  CT is consistent with a 7 mm left ureter stone with associated hydronephrosis.  Case was discussed with Dr. Palafox who agrees with hospitalization and keeping patient NPO.  Patient will require surgical intervention on this kidney stone with obstructive uropathy.  Case discussed with [facility] family medicine team who will hospitalize patient for ongoing management.  Patient is in guarded condition.  Of note while awaiting  hospitalization patient did have recurrent pain and was treated with repeat dose of fentanyl.       DISPOSITION AND DISCUSSIONS  I have discussed management of the patient with the following physicians and [facility]'s:  Dr. Palafox, [facility]    Discussion of management with other [facility] or appropriate source(s): none     Escalation of care considered, and ultimately not performed:see above    Barriers to care at this time, including but not limited to: none.     Decision tools and prescription drugs considered including, but not limited to: see above.        FINAL IMPRESSION  1. Nephrolithiasis    2. Obstructive uropathy    3. Flank pain

## 2024-04-23 NOTE — H&P
FAMILY MEDICINE HISTORY AND PHYSICAL       PATIENT ID:  NAME:  South Sharp  MRN:               [id number]  YOB: 1980    Date of Admission: 4/23/2024     Attending: Sony Conner M.d.     Resident: Saul Hamilton M.D.    Primary Care Physician:  Job Piper M.D. (Inactive)    CC:    Chief Complaint   Patient presents with    Painful Urination    Abdominal Pain     Pt diagnosed with 6mm kidney stoke to L  side on Saturday at .   Pt reports taking prescribed antibiotics however pain is unbearable. Wanting to discuss alternate options due to pain.        HPI: South Sharp is a 43 y.o. male with past medical history of testicular torsion, kidney stones, stress-induced asthma who presented with left flank pain.    Patient reports that pain began 3 days ago when he woke up in severe pain.  He went to urgent care and had CAT scan completed at that time.  Was told he had kidney stone in his left kidney.  He was started on Flomax as well as Zofran and oxycodone.  Patient reports that the pain persisted and worsened progressively until this morning which prompted him to come to care.  Today patient reports pain is sharp left flank pain with radiation to his left groin.  The pain is constant and associated with nausea.  He has had 1 episode of emesis yesterday but nothing today.  Patient denies fever, chills, changes to urination including painful urination, urgency, frequency.  Patient also denies chest pain and shortness of breath.    Of note patient has a history of stones which he has passed spontaneously approximately 1 year ago.  He is established with [facility] [place] because of this prior episode.  These stones were found to be calcium oxalate stones.    ERCourse:  In ED patient afebrile, regular pulse, normotensive and saturating well in room air    CBC without abnormality  BMP with elevated creatinine to 2.21 and estimated GFR of 37 increased from baseline  UA with trace ketones  without blood  CT renal colic with 7 mm obstructive stone in the left ureter  Evidence of fatty liver  Evidence of prior abdominal surgery    Patient received 100 mcg of fentanyl in the ED.  Urology was consulted and recommended n.p.o. with plans for possible lithotripsy this afternoon     REVIEW OF SYSTEMS:   Ten systems reviewed and were negative except as noted in the HPI.                PAST MEDICAL HISTORY:   has no past medical history on file.     PAST SURGICAL HISTORY:   has no past surgical history on file.     FAMILY HISTORY:  family history includes Diabetes in his father.     SOCIAL HISTORY:   Employment: Patient works at [employer]  Living Situation:  Lives with his wife and 4 kids  Smoking: Denies  Etoh: Denies  Recreational Drug: Denies    DIET:   Orders Placed This Encounter   Procedures    Diet NPO Restrict to: Sips with Medications     Standing Status:   Standing     Number of Occurrences:   1     Order Specific Question:   Diet NPO Restrict to:     Answer:   Sips with Medications [3]       ALLERGIES:  No Known Allergies    OUTPATIENT MEDICATIONS:    Current Facility-Administered Medications:     senna-docusate (Pericolace Or Senokot S) 8.6-50 MG per tablet 2 Tablet, 2 Tablet, Oral, Q EVENING **AND** polyethylene glycol/lytes (Miralax) Packet 1 Packet, 1 Packet, Oral, QDAY PRN, Saul Hamilton M.D.    ondansetron (Zofran) syringe/vial injection 4 mg, 4 mg, Intravenous, Q4HRS PRN, Saul Hamilton M.D.    ondansetron (Zofran ODT) dispertab 4 mg, 4 mg, Oral, Q4HRS PRN, Saul Hamilton M.D.    promethazine (Phenergan) tablet 12.5-25 mg, 12.5-25 mg, Oral, Q4HRS PRN, Saul Hamilton M.D.    promethazine (Phenergan) suppository 12.5-25 mg, 12.5-25 mg, Rectal, Q4HRS PRN, Saul Hamilton M.D.    prochlorperazine (Compazine) injection 5-10 mg, 5-10 mg, Intravenous, Q4HRS PRN, Saul Hamilton M.D.    Pharmacy Consult Request ...Pain Management Review 1 Each, 1 Each, Other, PHARMACY TO DOSE,  "Saul Hamilton M.D.    oxyCODONE immediate-release (Roxicodone) tablet 2.5 mg, 2.5 mg, Oral, Q3HRS PRN **OR** oxyCODONE immediate-release (Roxicodone) tablet 5 mg, 5 mg, Oral, Q3HRS PRN, 5 mg at 24 1415 **OR** HYDROmorphone (Dilaudid) injection 0.25 mg, 0.25 mg, Intravenous, Q3HRS PRN, Saul Hamilton M.D.    albuterol inhaler 2 Puff, 2 Puff, Inhalation, Q6HRS PRN, Saul Hamilton M.D.    [START ON 2024] tamsulosin (Flomax) capsule 0.4 mg, 0.4 mg, Oral, DAILY, Saul Hamilton M.D.    PHYSICAL EXAM:  Vitals:    24 1158 24 1302 24 1413 24 1419   BP: 117/73 130/75 122/70    Pulse: (!) 56 67 (!) 57    Resp:   18    Temp:   36.5 °C (97.7 °F)    TempSrc:   Temporal    SpO2: 96% 96% 96%    Weight:   88.5 kg (195 lb 1.7 oz) 88.5 kg (195 lb 1.7 oz)   Height:   1.753 m (5' 9\")    , Temp (24hrs), Av.6 °C (97.8 °F), Min:36.5 °C (97.7 °F), Max:36.6 °C (97.9 °F)  , Pulse Oximetry: 96 %, O2 Delivery Device: None - Room Air    Physical Exam  Constitutional:       General: He is in acute distress.      Appearance: He is ill-appearing and diaphoretic. He is not toxic-appearing.   HENT:      Head: Normocephalic and atraumatic.      Right Ear: External ear normal.      Left Ear: External ear normal.      Nose: No congestion.      Mouth/Throat:      Mouth: Mucous membranes are moist.      Pharynx: No oropharyngeal exudate.   Eyes:      General: No scleral icterus.     Extraocular Movements: Extraocular movements intact.   Cardiovascular:      Rate and Rhythm: Normal rate and regular rhythm.      Pulses: Normal pulses.      Heart sounds: No murmur heard.  Pulmonary:      Effort: Pulmonary effort is normal.      Breath sounds: Normal breath sounds.   Abdominal:      General: Abdomen is flat.      Tenderness: There is abdominal tenderness. There is left CVA tenderness. There is no right CVA tenderness, guarding or rebound.      Comments: TTP of the left lower quadrant   Musculoskeletal:   " "      General: Normal range of motion.      Cervical back: Normal range of motion.      Right lower leg: No edema.      Left lower leg: No edema.   Skin:     General: Skin is warm.   Neurological:      General: No focal deficit present.      Mental Status: He is alert and oriented to person, place, and time.      Cranial Nerves: No cranial nerve deficit.   Psychiatric:         Mood and Affect: Mood normal.         Behavior: Behavior normal.           LAB TESTS:   Recent Labs     04/23/24  1115   WBC 5.9   RBC 4.96   HEMOGLOBIN 15.4   HEMATOCRIT 43.1   MCV 86.9   MCH 31.0   MCHC 35.7   RDW 39.3   PLATELETCT 166   MPV 9.2     Recent Labs     04/23/24  1115   SODIUM 135   POTASSIUM 4.1   CHLORIDE 101   CO2 21   GLUCOSE 92   BUN 13   CREATININE 2.21*   CALCIUM 9.0     Recent Labs     04/23/24  1115   ALTSGPT 13   ASTSGOT 16   ALKPHOSPHAT 47   TBILIRUBIN 0.5   GLUCOSE 92         No results for input(s): \"NTPROBNP\" in the last 72 hours.      No results for input(s): \"TROPONINT\" in the last 72 hours.    CULTURES:   Results       Procedure Component Value Units Date/Time    Urinalysis, Culture if Indicated [[id number]]  (Abnormal) Collected: 04/23/24 1155    Order Status: Completed Specimen: Urine, Clean Catch Updated: 04/23/24 1223     Color Yellow     Character Clear     Specific Gravity 1.023     Ph 5.5     Glucose Negative mg/dL      Ketones Trace mg/dL      Protein Negative mg/dL      Bilirubin Negative     Urobilinogen, Urine 0.2     Nitrite Negative     Leukocyte Esterase Negative     Occult Blood Negative     Micro Urine Req see below     Comment: Microscopic examination not performed when specimen is clear  and chemically negative for protein, blood, leukocyte esterase  and nitrite.         URINALYSIS,CULTURE IF INDICATED [[id number]]     Order Status: Canceled Specimen: Urine, Clean Catch             IMAGES:  CT-RENAL COLIC EVALUATION(A/P W/O)   Final Result         1. There is a 7 mm stone in the mid left ureter " with mild left-sided hydroureteronephrosis.   2. Mild fatty infiltration of liver.   3. Small right renal calculi.   4. The patient has undergone interval ventral abdominal wall hernia repair.      DX-PORTABLE FLUOROSCOPY < 1 HOUR    (Results Pending)   EN-KOMZHIG-2 VIEW    (Results Pending)       CONSULTS:   Urology    ASSESSMENT/PLAN:   43 y.o. male admitted for obstructive uropathy secondary to renal calculus    I anticipate this patient will require at least two midnights for appropriate medical management, necessitating inpatient admission because obstructive uropathy     * Obstructive uropathy- (present on admission)  Assessment & Plan  Patient with obstructive uropathy secondary to 7 mm obstructing stone in the left ureter.  Patient with a history of calcium oxalate stones already established with urology.  Urology was consulted from the ED.  - Patient n.p.o. in anticipation of surgery  - Will work on pain control given severity of pain  - At this time there is no evidence of secondary infection we will continue to monitor clinical signs and symptoms  - AM labs    Fatty liver  Assessment & Plan  Patient with fatty liver on CT renal colic.  - Recommend outpatient follow-up    Exercise-induced asthma- (present on admission)  Assessment & Plan  - Albuterol as needed      Core Measures:  Fluids: Maintenance IV fluids  Lines: PIV  Abx: None  Diet: N.p.o. anticipating surgery  PPX: pharmacologic prophylaxis contraindicated due to expecting surgery    CODE Status: Full Code

## 2024-04-23 NOTE — PROGRESS NOTES
4 Eyes Skin Assessment Completed by DERIK Brown and DERIK Dave.    Head WDL  Ears WDL  Nose WDL  Mouth WDL  Neck WDL  Breast/Chest WDL  Shoulder Blades WDL  Spine WDL  (R) Arm/Elbow/Hand WDL  (L) Arm/Elbow/Hand WDL  Abdomen WDL  Groin WDL  Scrotum/Coccyx/Buttocks WDL  (R) Leg WDL  (L) Leg WDL  (R) Heel/Foot/Toe WDL  (L) Heel/Foot/Toe WDL          Devices In Places Blood Pressure Cuff      Interventions In Place Pillows    Possible Skin Injury No    Pictures Uploaded Into Epic N/A  Wound Consult Placed N/A  RN Wound Prevention Protocol Ordered No

## 2024-04-23 NOTE — ED TRIAGE NOTES
South Sharp  43 y.o. male  Chief Complaint   Patient presents with    Painful Urination    Abdominal Pain     Pt diagnosed with 6mm kidney stoke to L  side on Saturday at .   Pt reports taking prescribed antibiotics however pain is unbearable. Wanting to discuss alternate options due to pain.        Pt amb to triage with steady gait for above complaint.   Pt is alert and oriented, speaking in full sentences, follows commands and responds appropriately to questions. Not in any apparent distress. Respirations are even and unlabored.  Pt placed in lobby. Pt educated on triage process. Pt encouraged to alert staff for any changes.

## 2024-04-23 NOTE — PROGRESS NOTES
FAMILY MEDICINE HISTORY AND PHYSICAL       PATIENT ID:  NAME:  South Sharp  MRN:               [id number]  YOB: 1980    Date of Admission: 4/23/2024     Attending: Sony Conner M.d.     Resident: Ananda Camp, Student    Primary Care Physician:  Job Piper M.D. (Inactive)    CC:    Chief Complaint   Patient presents with    Painful Urination    Abdominal Pain     Pt diagnosed with 6mm kidney stoke to L  side on Saturday at .   Pt reports taking prescribed antibiotics however pain is unbearable. Wanting to discuss alternate options due to pain.        HPI: South Sharp is a 43 y.o. male who presented with painful urination and left flank pain that began on 4/20/2024. He has a prior history of renal stones and states this feels similar to prior renal stone episodes. Prior stones were calcium oxalate and passed spontaneously without surgical intervention. [facility] Seen at  facility on 4/20/24 and was diagnosed with 6mm kidney stone and started on oxycodone, flomax and zofran but symptoms have persisted. The left flank pain became more severe when he woke up this morning. This pain is described as constant, sharp, radiates to left groin and is associated with nausea. Nausea was resolved with zofran but pain remains severe. Denies fever, chills, vomiting, changes in bowel movements. Patient last ate at 9AM this morning.       ERCourse:  In the ED, vitals were stable and within normal limits. Labs significant for normal WBCs, normal H/H, normal electrolytes, Cr 2.21, GFR 37 and UA normal except for trace ketones.CT renal colic showed 7mm stone in mid left ureter with mild left hydroureteronephrosis, mild fatty infiltration of liver, small right renal calculi and surgical changes from prior ventral hernia repair. Patient was treated with 100mcg fentanyl and case was discussed between ED physician and Dr. Palafox from [facility] [place] who agrees to hospitalize  patient, keep NPO and treat surgically.     REVIEW OF SYSTEMS:   Ten systems reviewed and were negative except as noted in the HPI.                PAST MEDICAL HISTORY:   has no past medical history on file.     PAST SURGICAL HISTORY:   has no past surgical history on file.     FAMILY HISTORY:  family history includes Diabetes in his father.     SOCIAL HISTORY:   Employment: [employer]  Living Situation: Lives with wife and four kids  Smoking: Never  Etoh: Never  Recreational Drug: Never    DIET:   Orders Placed This Encounter   Procedures    Diet NPO Restrict to: Sips with Medications     Standing Status:   Standing     Number of Occurrences:   1     Order Specific Question:   Diet NPO Restrict to:     Answer:   Sips with Medications [3]       ALLERGIES:  No Known Allergies    OUTPATIENT MEDICATIONS:    Current Facility-Administered Medications:     senna-docusate (Pericolace Or Senokot S) 8.6-50 MG per tablet 2 Tablet, 2 Tablet, Oral, Q EVENING **AND** polyethylene glycol/lytes (Miralax) Packet 1 Packet, 1 Packet, Oral, QDAY PRN, Saul Hamilton M.D.    ondansetron (Zofran) syringe/vial injection 4 mg, 4 mg, Intravenous, Q4HRS PRN, Saul Hamilton M.D.    ondansetron (Zofran ODT) dispertab 4 mg, 4 mg, Oral, Q4HRS PRN, Saul Hamilton M.D.    promethazine (Phenergan) tablet 12.5-25 mg, 12.5-25 mg, Oral, Q4HRS PRN, Saul Hamilton M.D.    promethazine (Phenergan) suppository 12.5-25 mg, 12.5-25 mg, Rectal, Q4HRS PRN, Saul Hamilton M.D.    prochlorperazine (Compazine) injection 5-10 mg, 5-10 mg, Intravenous, Q4HRS PRN, Saul Hamilton M.D.    Pharmacy Consult Request ...Pain Management Review 1 Each, 1 Each, Other, PHARMACY TO DOSE, Saul Hamilton M.D.    oxyCODONE immediate-release (Roxicodone) tablet 2.5 mg, 2.5 mg, Oral, Q3HRS PRN **OR** oxyCODONE immediate-release (Roxicodone) tablet 5 mg, 5 mg, Oral, Q3HRS PRN **OR** HYDROmorphone (Dilaudid) injection 0.25 mg, 0.25 mg, Intravenous, Q3HRS PRN,  "Saul Hamilton M.D.    albuterol inhaler 2 Puff, 2 Puff, Inhalation, Q6HRS PRN, Saul Hamilton M.D.    [START ON 2024] tamsulosin (Flomax) capsule 0.4 mg, 0.4 mg, Oral, DAILY, Saul Hamilton M.D.    Current Outpatient Medications:     oxyCODONE immediate-release (ROXICODONE) 5 MG Tab, Take 5 mg by mouth every 6 hours as needed for Severe Pain. Up to 3 days, Disp: , Rfl:     ondansetron (ZOFRAN ODT) 4 MG TABLET DISPERSIBLE, Take 1 Tablet by mouth every 6 hours as needed for Nausea., Disp: 10 Tablet, Rfl: 0    tamsulosin (FLOMAX) 0.4 MG capsule, Take 1 Capsule by mouth every day., Disp: 30 Capsule, Rfl: 0    albuterol 108 (90 Base) MCG/ACT Aero Soln inhalation aerosol, Inhale 2 Puffs by mouth every 6 hours as needed for Shortness of Breath., Disp: 8.5 g, Rfl: 4    PHYSICAL EXAM:  Vitals:    24 1010 24 1138 24 1158 24 1302   BP: 123/72 116/73 117/73 130/75   Pulse: 61 65 (!) 56 67   Resp: 16      Temp: 36.6 °C (97.9 °F)      TempSrc: Temporal      SpO2: 96% 96% 96% 96%   Weight: 88.5 kg (195 lb 1.7 oz)      Height: 1.753 m (5' 9\")      , Temp (24hrs), Av.6 °C (97.9 °F), Min:36.6 °C (97.9 °F), Max:36.6 °C (97.9 °F)  , Pulse Oximetry: 96 %, O2 Delivery Device: None - Room Air    Physical Exam  Constitutional:       General: He is in acute distress.      Appearance: He is ill-appearing and diaphoretic.   HENT:      Head: Normocephalic and atraumatic.      Nose: Nose normal.      Mouth/Throat:      Mouth: Mucous membranes are dry.      Pharynx: Oropharynx is clear.   Eyes:      Extraocular Movements: Extraocular movements intact.      Conjunctiva/sclera: Conjunctivae normal.      Pupils: Pupils are equal, round, and reactive to light.   Cardiovascular:      Rate and Rhythm: Normal rate and regular rhythm.   Pulmonary:      Effort: Pulmonary effort is normal.      Breath sounds: Normal breath sounds.   Abdominal:      General: Abdomen is flat. Bowel sounds are normal.      " "Palpations: Abdomen is soft.      Tenderness: There is left CVA tenderness.      Comments: TTP in LLQ   Musculoskeletal:         General: Normal range of motion.      Cervical back: Normal range of motion and neck supple.   Skin:     General: Skin is warm.   Neurological:      General: No focal deficit present.      Mental Status: He is alert and oriented to person, place, and time.           LAB TESTS:   Recent Labs     04/23/24  1115   WBC 5.9   RBC 4.96   HEMOGLOBIN 15.4   HEMATOCRIT 43.1   MCV 86.9   MCH 31.0   MCHC 35.7   RDW 39.3   PLATELETCT 166   MPV 9.2     Recent Labs     04/23/24  1115   SODIUM 135   POTASSIUM 4.1   CHLORIDE 101   CO2 21   GLUCOSE 92   BUN 13   CREATININE 2.21*   CALCIUM 9.0     Recent Labs     04/23/24  1115   ALTSGPT 13   ASTSGOT 16   ALKPHOSPHAT 47   TBILIRUBIN 0.5   GLUCOSE 92         No results for input(s): \"NTPROBNP\" in the last 72 hours.      No results for input(s): \"TROPONINT\" in the last 72 hours.    CULTURES:   Results       Procedure Component Value Units Date/Time    Urinalysis, Culture if Indicated [[id number]]  (Abnormal) Collected: 04/23/24 1155    Order Status: Completed Specimen: Urine, Clean Catch Updated: 04/23/24 1223     Color Yellow     Character Clear     Specific Gravity 1.023     Ph 5.5     Glucose Negative mg/dL      Ketones Trace mg/dL      Protein Negative mg/dL      Bilirubin Negative     Urobilinogen, Urine 0.2     Nitrite Negative     Leukocyte Esterase Negative     Occult Blood Negative     Micro Urine Req see below     Comment: Microscopic examination not performed when specimen is clear  and chemically negative for protein, blood, leukocyte esterase  and nitrite.         URINALYSIS,CULTURE IF INDICATED [[id number]]     Order Status: Canceled Specimen: Urine, Clean Catch             IMAGES:  CT-RENAL COLIC EVALUATION(A/P W/O)   Final Result         1. There is a 7 mm stone in the mid left ureter with mild left-sided hydroureteronephrosis.   2. Mild fatty " infiltration of liver.   3. Small right renal calculi.   4. The patient has undergone interval ventral abdominal wall hernia repair.          CONSULTS:   Urology    ASSESSMENT/PLAN:   43 y.o. male admitted for nephrolithiasis and obstructive uropathy.     #Ureteral stone  CBC WNL  CT Renal 4/23 - 7mm stone in left mid ureter   UA with trace ketones, no blood or signs of infection  Urology consulted  -Pain control  -Nausea control  -Plan for surgery tonight with Dr. Palafox    #Obstructive uropathy  Cr 2.21, GFR 37  Likely etiology is obstructing stone and is likely to resolve following stone removal  -Recheck labs following stone removal    I anticipate this patient will require at least one night in the hospital for surgical treatment of left ureteral stone.       Core Measures:  Fluids: None  Lines: PIV  Abx: None  Diet: NPO  PPX: SCDs/TEDs    CODE Status: Full Code

## 2024-04-23 NOTE — PROGRESS NOTES
43 year old with left flank pain as well as left proximal ureteral stone. Discussed with the patient the options of care and with JUAN CARLOS the need for a left stent. Will proceed with cystoscopy, left ureteroscopy, laser lithotripsy and stent placement. Risks and benefits of the procedure discussed and informed consent has been given to me to proceed.

## 2024-04-23 NOTE — ED NOTES
Pt ambulated to the room with steady gait and without assistance. Unable to provide urine sample at this time. Changed into a gown. Call light within reach. No further complaints at this time. Chart up for ERP.

## 2024-04-23 NOTE — ANESTHESIA PREPROCEDURE EVALUATION
Case: [id number] Date/Time: 04/23/24 9391    Procedure: CYSTOSCOPY, WITH URETERAL STENT INSERTION (Left)    Location: [facility] OR 17 / SURGERY [facility]    Surgeons: Pool Palafox M.D.            Relevant Problems   PULMONARY   (positive) Exercise-induced asthma   (positive) Occupational asthma without complication         (positive) Fatty liver       Physical Exam    Airway   Mallampati: III  TM distance: >3 FB       Cardiovascular - normal exam     Dental    Pulmonary    Abdominal    Neurological                Anesthesia Plan    ASA 2       Plan - general       Airway plan will be ETT          Induction: intravenous    Postoperative Plan: Postoperative administration of opioids is intended.    Pertinent diagnostic labs and testing reviewed    Informed Consent:    Anesthetic plan and risks discussed with patient.    Use of blood products discussed with: whom consented to blood products.

## 2024-04-23 NOTE — ASSESSMENT & PLAN NOTE
Patient with obstructive uropathy secondary to 7 mm obstructing stone in the left ureter.  Patient with a history of calcium oxalate stones already established with urology.  Urology was consulted from the ED.  - Patient n.p.o. in anticipation of surgery  - Will work on pain control given severity of pain  - At this time there is no evidence of secondary infection we will continue to monitor clinical signs and symptoms  - AM labs

## 2024-04-23 NOTE — ED NOTES
Med Rec complete per patient   Allergies reviewed  Antibiotics in the past 30 days:no  Anticoagulant in past 14 days:no  Pharmacy patient utilizes:[facility] on [address]    Verified Opioid with pharmacy

## 2024-04-24 ENCOUNTER — TELEPHONE (OUTPATIENT)
Dept: HOSPITALIST | Facility: MEDICAL CENTER | Age: 44
End: 2024-04-24
Payer: COMMERCIAL

## 2024-04-24 VITALS
BODY MASS INDEX: 28.9 KG/M2 | RESPIRATION RATE: 20 BRPM | HEIGHT: 69 IN | OXYGEN SATURATION: 95 % | TEMPERATURE: 96.2 F | DIASTOLIC BLOOD PRESSURE: 57 MMHG | HEART RATE: 51 BPM | WEIGHT: 195.11 LBS | SYSTOLIC BLOOD PRESSURE: 101 MMHG

## 2024-04-24 LAB
ANION GAP SERPL CALC-SCNC: 11 MMOL/L (ref 7–16)
BASOPHILS # BLD AUTO: 0 % (ref 0–1.8)
BASOPHILS # BLD: 0 K/UL (ref 0–0.12)
BUN SERPL-MCNC: 16 MG/DL (ref 8–22)
CALCIUM SERPL-MCNC: 8.6 MG/DL (ref 8.5–10.5)
CHLORIDE SERPL-SCNC: 100 MMOL/L (ref 96–112)
CO2 SERPL-SCNC: 24 MMOL/L (ref 20–33)
CREAT SERPL-MCNC: 1.88 MG/DL (ref 0.5–1.4)
EOSINOPHIL # BLD AUTO: 0 K/UL (ref 0–0.51)
EOSINOPHIL NFR BLD: 0 % (ref 0–6.9)
ERYTHROCYTE [DISTWIDTH] IN BLOOD BY AUTOMATED COUNT: 39.4 FL (ref 35.9–50)
GFR SERPLBLD CREATININE-BSD FMLA CKD-EPI: 45 ML/MIN/1.73 M 2
GLUCOSE SERPL-MCNC: 105 MG/DL (ref 65–99)
HCT VFR BLD AUTO: 40.9 % (ref 42–52)
HGB BLD-MCNC: 14.8 G/DL (ref 14–18)
IMM GRANULOCYTES # BLD AUTO: 0.03 K/UL (ref 0–0.11)
IMM GRANULOCYTES NFR BLD AUTO: 0.5 % (ref 0–0.9)
LYMPHOCYTES # BLD AUTO: 0.78 K/UL (ref 1–4.8)
LYMPHOCYTES NFR BLD: 13.7 % (ref 22–41)
MCH RBC QN AUTO: 31.7 PG (ref 27–33)
MCHC RBC AUTO-ENTMCNC: 36.2 G/DL (ref 32.3–36.5)
MCV RBC AUTO: 87.6 FL (ref 81.4–97.8)
MONOCYTES # BLD AUTO: 0.14 K/UL (ref 0–0.85)
MONOCYTES NFR BLD AUTO: 2.5 % (ref 0–13.4)
NEUTROPHILS # BLD AUTO: 4.75 K/UL (ref 1.82–7.42)
NEUTROPHILS NFR BLD: 83.3 % (ref 44–72)
NRBC # BLD AUTO: 0 K/UL
NRBC BLD-RTO: 0 /100 WBC (ref 0–0.2)
PLATELET # BLD AUTO: 178 K/UL (ref 164–446)
PMV BLD AUTO: 10 FL (ref 9–12.9)
POTASSIUM SERPL-SCNC: 5.1 MMOL/L (ref 3.6–5.5)
RBC # BLD AUTO: 4.67 M/UL (ref 4.7–6.1)
SODIUM SERPL-SCNC: 135 MMOL/L (ref 135–145)
WBC # BLD AUTO: 5.7 K/UL (ref 4.8–10.8)

## 2024-04-24 PROCEDURE — 0T778DZ DILATION OF LEFT URETER WITH INTRALUMINAL DEVICE, VIA NATURAL OR ARTIFICIAL OPENING ENDOSCOPIC: ICD-10-PCS | Performed by: UROLOGY

## 2024-04-24 PROCEDURE — 80048 BASIC METABOLIC PNL TOTAL CA: CPT

## 2024-04-24 PROCEDURE — A9270 NON-COVERED ITEM OR SERVICE: HCPCS

## 2024-04-24 PROCEDURE — 99238 HOSP IP/OBS DSCHRG MGMT 30/<: CPT | Mod: GC | Performed by: FAMILY MEDICINE

## 2024-04-24 PROCEDURE — 85025 COMPLETE CBC W/AUTO DIFF WBC: CPT

## 2024-04-24 PROCEDURE — 700102 HCHG RX REV CODE 250 W/ 637 OVERRIDE(OP)

## 2024-04-24 PROCEDURE — 36415 COLL VENOUS BLD VENIPUNCTURE: CPT

## 2024-04-24 PROCEDURE — 0TF48ZZ FRAGMENTATION IN LEFT KIDNEY PELVIS, VIA NATURAL OR ARTIFICIAL OPENING ENDOSCOPIC: ICD-10-PCS | Performed by: UROLOGY

## 2024-04-24 RX ADMIN — TAMSULOSIN HYDROCHLORIDE 0.4 MG: 0.4 CAPSULE ORAL at 06:02

## 2024-04-24 RX ADMIN — OXYCODONE HYDROCHLORIDE 2.5 MG: 5 TABLET ORAL at 06:07

## 2024-04-24 ASSESSMENT — ENCOUNTER SYMPTOMS
COUGH: 0
DIARRHEA: 0
HEADACHES: 0
CHILLS: 0
HEARTBURN: 0
FLANK PAIN: 0
BACK PAIN: 0
FEVER: 0
DEPRESSION: 0
DIZZINESS: 0
NAUSEA: 0
CONSTIPATION: 0
ABDOMINAL PAIN: 0
VOMITING: 0

## 2024-04-24 ASSESSMENT — COGNITIVE AND FUNCTIONAL STATUS - GENERAL
DAILY ACTIVITIY SCORE: 24
MOBILITY SCORE: 24
SUGGESTED CMS G CODE MODIFIER DAILY ACTIVITY: CH
SUGGESTED CMS G CODE MODIFIER MOBILITY: CH

## 2024-04-24 ASSESSMENT — PAIN DESCRIPTION - PAIN TYPE
TYPE: ACUTE PAIN

## 2024-04-24 NOTE — OR NURSING
Patient denies pain and nausea. Patient resting comfortably. Patient wife updated via phone. VSS. Report provided to Margo Werner RN. Patient transported off unit with transport on 1L O2 @ 95%. All personal belongings transported with patient.

## 2024-04-24 NOTE — PROGRESS NOTES
Note to reader: this note follows the APSO format rather than the historical SOAP format. Assessment and plan located at the top of the note for ease of use.    Chief Complaint  43 y.o. year old male here with obstructive UPJ stone.     Assessment/Plan  Interval History     Plan:  No further urologic interventions planned at this time  Can sent pt home with NSAIDS, pyridium, oral abx, oxybutynin and flomax.   Urology signing off   Pt will follow up in one week with our office for stent removal and discuss stone prevention.     Case discussed with Dr. Palafox who has directed this plan of care     Patient seen and examined    4/24- Pt feeling much improved today. Reviewed results of op report CT and Labs in detail with pt. Cr 1.88(2.21) GFR 45(37). Reviewed stent irritation.            Review of Systems  Physical Exam   Review of Systems   Constitutional:  Negative for chills and fever.   HENT:  Negative for hearing loss.    Respiratory:  Negative for cough.    Cardiovascular:  Negative for chest pain.   Gastrointestinal:  Negative for abdominal pain, constipation, diarrhea, heartburn, nausea and vomiting.   Genitourinary:  Positive for hematuria and urgency. Negative for flank pain.   Musculoskeletal:  Negative for back pain.   Skin:  Negative for rash.   Neurological:  Negative for dizziness and headaches.   Psychiatric/Behavioral:  Negative for depression.    All other systems reviewed and are negative.    Vitals:    04/24/24 0004 04/24/24 0102 04/24/24 0506 04/24/24 0800   BP: 102/58 104/52 101/60 101/57   Pulse: (!) 53 (!) 56 (!) 55 (!) 51   Resp: 14 14 14 20   Temp: 36.3 °C (97.3 °F) 36.2 °C (97.2 °F) 36.2 °C (97.2 °F) 36.2 °C (97.1 °F)   TempSrc: Temporal Temporal Temporal Temporal   SpO2: 93% 94% 94% 95%   Weight:       Height:         Physical Exam  Vitals and nursing note reviewed.   HENT:      Head: Normocephalic.      Nose: Nose normal.      Mouth/Throat:      Mouth: Mucous membranes are moist.    Eyes:      Pupils: Pupils are equal, round, and reactive to light.   Cardiovascular:      Rate and Rhythm: Normal rate.   Pulmonary:      Effort: Pulmonary effort is normal.   Abdominal:      General: Abdomen is flat.   Skin:     General: Skin is warm.   Neurological:      Mental Status: He is alert.          Hematology Chemistry   Lab Results   Component Value Date/Time    WBC 5.7 04/24/2024 02:25 AM    HEMOGLOBIN 14.8 04/24/2024 02:25 AM    HEMATOCRIT 40.9 (L) 04/24/2024 02:25 AM    PLATELETCT 178 04/24/2024 02:25 AM     Lab Results   Component Value Date/Time    SODIUM 135 04/24/2024 02:25 AM    POTASSIUM 5.1 04/24/2024 02:25 AM    CHLORIDE 100 04/24/2024 02:25 AM    CO2 24 04/24/2024 02:25 AM    GLUCOSE 105 (H) 04/24/2024 02:25 AM    BUN 16 04/24/2024 02:25 AM    CREATININE 1.88 (H) 04/24/2024 02:25 AM         Labs not explicitly included in this progress note were reviewed by the author.   Radiology/imaging not explicitly included in this progress note was reviewed by the author.     Medications reviewed, Labs reviewed and Radiology images reviewed                          Kiki Rose P.A.-C.   [address] [place] [id number] [phone]

## 2024-04-24 NOTE — OR SURGEON
Immediate Post OP Note    PreOp Diagnosis: Left flank pain                               Left proximal ureteral stone      PostOp Diagnosis: As above      Procedure(s):  CYSTOSCOPY   LEFT URETEROSCOPY WITH LASER LITHOTRIPSY    LEFT STENT ON A STRING - Wound Class: Clean Contaminated    Surgeon(s):  Pool Palafox M.D.    Anesthesiologist/Type of Anesthesia:  Anesthesiologist: Gracia Fierro M.D./General ETT    Surgical Staff:  Circulator: Sujata Justice R.N.  Laser Staff: Gavin Lim  Scrub Person: Nicole Palma    Specimens removed if any:      Estimated Blood Loss: N/A    Findings: High grade obstruction of the proximal left ureter with drainage of contrast dye.    Complications: None  Drains: 6 Czech x 26 cm stennt on a string        4/23/2024 5:35 PM Pool Palafox M.D.

## 2024-04-24 NOTE — CONSULTS
DATE OF SERVICE:  04/23/2024     UROLOGIC CONSULTATION     CONSULTATION REQUESTED BY:  Lee Ann Ahn MD, emergency room physician   regarding left flank pain with acute kidney injury and left ureteral stone.     CONSULTATION PERFORMED BY:  Pool Palafox MD, [facility], [place].     CHIEF COMPLAINT:  This is a pleasant 43-year-old male with a history of acute   onset of left flank pain and now a 7x6 mm stone in left proximal ureter with   associated acute kidney injury.     HISTORY OF PRESENT ILLNESS:  The patient has a prior history of testicular   torsion for whom I have cared for approximately 9 months ago.  He also has a   prior history of kidney stones, passing 1 about a year ago and the patient   presented this morning with worsening left flank pain, associated radiation to   left groin and nausea.  He had one episode of emesis.  He denies any fever,   chills, dysuria, urgency, frequency.     PAST MEDICAL HISTORY:  Noteworthy for testicular torsion.     PAST SURGICAL HISTORY:  Has has had scrotal exploration and orchiopexy.     MEDICATIONS:  None.     ALLERGIES:  No known drug allergies.     SOCIAL HISTORY:  He works at [employer].  He has a wife, 4 kids here and does not   smoke, drink or use drugs.     REVIEW OF SYSTEMS:  He denies any chest pain, dyspnea on exertion, skipped   heartbeats, fever, chills, weight loss or weight gain.  He denies any gross   hematuria.     PHYSICAL EXAMINATION:  GENERAL:  He is a well-developed, well-nourished male in no acute distress   when I examined him.  HEENT:  Normocephalic, atraumatic.  Pupils equal, round.  Sclerae nonicteric.  NECK:  Supple.  CHEST:  Clear bilaterally.  CARDIOVASCULAR:  Regular rate and rhythm without murmur.  ABDOMEN:  Soft, tender to deep palpation, but no guarding or rebound.  He does   have some left costovertebral angle tenderness.  EXTREMITIES:  Without clubbing, cyanosis, or edema.  NEUROLOGIC:  Cranial nerves II-XII intact.  Motor and sensory  examination   nonfocal.     LABORATORY DATA:  Show white count of 5900, hemoglobin of 15.4.  His   creatinine is 2.21 with a normal creatinine at baseline.  I have personally   reviewed the CT scan that shows evidence of a 7 mm proximal ureteral stone   with obstruction of contrast.  This contrast was administered to the level of   the stone, suggesting a high-grade obstruction.     ASSESSMENT:  Left proximal ureteral stone with associated acute kidney injury.     PLAN AND RECOMMENDATION:  I explained to the patient as a minimum he needs a   stent or nephrostomy tube.  He would like to proceed with cystoscopy, attempt   at treatment of the stone if possible and stent placement.  I explained that   stent will be positioned because of the acute kidney injury, may be a stent on   a string or require removal at a second procedure.  He is also aware if that   I am unable to treat at this time, a secondary procedure may be needed and we   discussed the risk of the procedure including urethral strictures, delayed   complication of instrumentation, risk of urinary tract infection, urosepsis, a   risk of ureteral perforation, albeit rare.  He is aware of the stent can   cause urgency, frequency, hematuria and flank pain and failure to follow   through with stent removal could result in significant stone burden and loss   of renal function.  I have also discussed the risk of deep vein thrombosis,   pulmonary embolism, aspiration, pneumonia and death.  Informed consent was   given to me by the patient to proceed and he is marked on his left thigh with   my initials DH and letter Y for safe site surgery.        ______________________________  MD KANDY Polanco/ALISHA    DD:  04/23/2024 16:41  DT:  04/23/2024 17:07    Job#:  [id number]

## 2024-04-24 NOTE — PROGRESS NOTES
Received report and assumed care of patient at change of shift. Patient is A&Ox4, on RA, and reports 4/10 pain at this time. Patient assessment completed, bed in lowest position, and call light and personal belongings are within reach. Patient expressed no further needs at this time.

## 2024-04-24 NOTE — ANESTHESIA PROCEDURE NOTES
Airway    Date/Time: 4/23/2024 5:02 PM    Performed by: Gracia Fierro M.D.  Authorized by: Gracia Fierro M.D.    Location:  OR  Urgency:  Elective  Difficult Airway: No    Indications for Airway Management:  Anesthesia      Spontaneous Ventilation: absent    Sedation Level:  Deep  Preoxygenated: Yes    Patient Position:  Sniffing  Mask Difficulty Assessment:  2 - vent by mask + OA or adjuvant +/- NMBA  Final Airway Type:  Endotracheal airway  Final Endotracheal Airway:  ETT  Cuffed: Yes    Technique Used for Successful ETT Placement:  Direct laryngoscopy  Devices/Methods Used in Placement:  Anterior pressure/BURP    Insertion Site:  Oral  Blade Type:  Meade  Laryngoscope Blade/Videolaryngoscope Blade Size:  2  ETT Size (mm):  7.0  Measured from:  Teeth  ETT to Teeth (cm):  22  Placement Verified by: auscultation and capnometry    Cormack-Lehane Classification:  Grade IIa - partial view of glottis  Number of Attempts at Approach:  1

## 2024-04-24 NOTE — ANESTHESIA POSTPROCEDURE EVALUATION
Patient: [place]    Procedure Summary       Date: 04/23/24 Room / Location: [place] [address] / SURGERY [facility]    Anesthesia Start: 1649 Anesthesia Stop: 1752    Procedure: CYSTOSCOPY, WITH LEFT URETEROSCOPY, LASER LITHOTRIPSY AND LEFT STENT (Left: Ureter) Diagnosis: (Left proximal ureteral stone with associated acute kidney injury.)    Surgeons: Pool Palafox M.D. Responsible Provider: Gracia Fierro M.D.    Anesthesia Type: general ASA Status: 2            Final Anesthesia Type: general  Last vitals  BP   Blood Pressure: 129/79    Temp   36.7 °C (98.1 °F)    Pulse   72   Resp   19    SpO2   100 %      Anesthesia Post Evaluation    Patient location during evaluation: PACU  Patient participation: complete - patient participated  Level of consciousness: awake and alert  Pain score: 4    Airway patency: patent  Anesthetic complications: no  Cardiovascular status: adequate  Respiratory status: acceptable and face mask  Hydration status: acceptable    PONV: none          No notable events documented.     Nurse Pain Score: 0 (NPRS)

## 2024-04-24 NOTE — PROGRESS NOTES
Received report and assumed care of patient to room 520-1 from PACU. Patient is A&Ox 4, on 1L nasal canula, and denies at this time. Patient assessment completed, bed in lowest position, and call light and personal belongings are within reach. Patient expressed no further needs at this time.

## 2024-04-24 NOTE — CARE PLAN
The patient is Stable - Low risk of patient condition declining or worsening    Shift Goals  Clinical Goals: Patient will be monitored and treated to acheive pain goal of sleeping comfortably.  Patient Goals: Patient will rest comfortably throughout the shift.    Progress made toward(s) clinical / shift goals:  Patient pain monitored throughout the shift and will be treated as needed. Patient resting comfortably for the remainder of the shift.     Patient is not progressing towards the following goals:

## 2024-04-24 NOTE — ANESTHESIA TIME REPORT
Anesthesia Start and Stop Event Times       Date Time Event    4/23/2024 1645 Ready for Procedure     1649 Anesthesia Start     1752 Anesthesia Stop          Responsible Staff  04/23/24      Name Role Begin End    Gracia Fierro M.D. Anesth 1649 1753          Overtime Reason:  no overtime (within assigned shift)    Comments:

## 2024-04-24 NOTE — OP REPORT
DATE OF SERVICE:  04/23/2024     PREOPERATIVE DIAGNOSES:  1.  Left flank pain.  2.  Acute kidney injury.  3.  Left proximal ureteral stone.     OPERATIONS AND PROCEDURES PERFORMED:  1.  Rigid cystourethroscopy.  2.  Left flexible ureteroscopy with holmium laser lithotripsy of 6x7 mm   ureteral stone pushed into the kidney.  3.  Left stent on a string placement 6-Swiss x 26 cm.     SURGEON:  Pool Palafox MD     ANESTHESIA:  General endotracheal.     ANESTHESIOLOGIST:  . Gracia Fierro MD     POSTOPERATIVE DIAGNOSES:  As above.     COMPLICATIONS:  None.     DRAINS:  A 6-Swiss x26 cm stent on a string.     INDICATIONS:  The patient is a pleasant 43-year-old male with a history of   recurrent nephrolithiasis.  He presented with acute onset of flank pain and   also has acute kidney injury.  I discussed with the patient the need for   drainage with the stent and the possibility of treatment as long as there is   no evidence of infection.  Prior to surgery, we discussed the fact that he has   a 6x7 mm stone in the distribution of the left proximal ureter and the   recommendation would be cystoscopy, left stent, ureteroscopy, laser   lithotripsy.  He is aware of the risk of the procedure including, but not   limited to risk of perioperative urinary tract infection, risk of urosepsis,   risk of urethral strictures, delayed complication of instrumentation of the   urethra.  He is aware that with ureteroscopy, there is a slight risk of   ureteral injury and stricture, albeit less than 1% and I explained to the   patient that the stent would be positioned due the acute kidney injury and   likely on a string, which could be removed next week once the renal function   improves.  I also discussed the possible need for secondary procedures and the   inability to treat him if he has evidence of urinary tract infection above   the stone.  In addition, we discussed the perioperative risk of deep vein   thrombosis, pulmonary  embolism, aspiration pneumonia, heart attack, stroke and   death.  The patient is marked in the preoperative holding area on his left   thigh with my initials DH and letter Y for safe site surgery.     DESCRIPTION IN DETAIL:  After informed consent was obtained, the patient was   brought to the operating room and placed supine.  Bilateral sequential   compression devices were in place and operational.  General endotracheal   anesthetic administered in balanced fashion by Dr. Gracia Fierro.  The   patient received weight-based Ancef and then in the modified lithotomy   position, the operative area was Betadine prepped and draped in the usual   sterile fashion.  Surgical timeout was called.  All members of the operative   team agree as the patient's name, procedure to be performed and the fact that   it is the left side.  At this point in time, spot shot fluoroscopy was   performed.  I could not identify a stone.  At this point, I advanced a   25-Honduran rigid cystoscope per urethra.  His anterior urethra was normal.    Prostatic fossa measured 4.5 cm in length with bilobar subocclusive   hypertrophy.  Upon entering the bladder, serial evaluation shows the right   ureteral orifice to be orthotopic, has clear efflux.  The bladder shows no   evidence of stone, tumor, or diverticula.  Left ureteral orifice has no efflux   and a spot shot of fluoroscopy shows retention of urine in the kidney   consistent with a proximal ureteral obstruction and a nephrogram persisting   after contrast administration.  At this point in time, I advanced a 0.35   sensor wire into the left ureteral orifice.  When I got above the stone, the   contrast drained and he can see this fluoroscopically.  It drained under high   pressure as this was a high-grade obstruction.  Over that wire, I advanced the   inner obturator of an 11-Honduran tapered to 13-Honduran ureteral access sheath.   After dilating the distal ureter, I advanced the entire sheath  into the mid   ureter and then a flexible ureteroscope was used with hydrodistention and the   stone had been pushed from the proximal ureter into the kidney.  Serial   evaluation of the kidney showed one Michael's plaque.  About an 8x7 mm   brownish appearing stone was identified.  The outer surface was treated at a   frequency of 20 Hz and 400 millijoules.  The inner surface was more dense and   treated at 800 millijoules and a frequency of 20 Hz, and sent all broken into   submillimetric particles.  At the completion of the case, serial evaluation of   the upper pole, mid pole and lower pole showed no residual large fragments.    The proximal ureter was normal, but because of the acute injury to the kidney,   I elected to leave a stent on a string.  I therefore advanced a safety wire   back into the kidney.  I withdrew out the ureteral access sheath and   visualized the entire ureter.  There was an atraumatic dilation with the   ureteral access sheath and the ureteral orifice was nicely dilated as well.    With the safety wire in place, I backloaded this into the 25-Swedish rigid   cystoscope and then pushed up a 6-Swedish x 26 cm double-J stent on a string.    When I withdrew the wire, there was a good coil in the kidney, there was a   good coil in the bladder and the stent on a string was brought out through the   urethra after the bladder was drained and secured to the phallus with   Mastisol and Steri-Strips.  At the end the case, the patient was awakened in   the operating room and transferred to recovery room where he arrived in stable   condition.  His disposition will be readmission to the hospital service,   recheck a basic metabolic panel tomorrow and discharge likely tomorrow with   outpatient pain medicine and follow up at [facility], [place] on Monday for stent   on a string removal.        ______________________________  MD KANDY Polanco/ALISHA    DD:  04/24/2024 08:48  DT:  04/24/2024 09:35    Job#:   [id number]

## 2024-04-24 NOTE — DISCHARGE SUMMARY
"[facility] Discharge Summary    Attending: Ubaldo  Senior Resident: Dr. Saul Hamilton  Intern:  Dr. Rosa Cm   Contact Number: [phone]    CHIEF COMPLAINT ON ADMISSION  Chief Complaint   Patient presents with    Painful Urination    Abdominal Pain     Pt diagnosed with 6mm kidney stoke to L  side on Saturday at .   Pt reports taking prescribed antibiotics however pain is unbearable. Wanting to discuss alternate options due to pain.        Reason for Admission  ABD pain     Admission Date  4/23/2024    CODE STATUS  Full Code    HPI from H&P:     \"[place] is a 43 y.o. male with past medical history of testicular torsion, kidney stones, stress-induced asthma who presented with left flank pain.     Patient reports that pain began 3 days ago when he woke up in severe pain.  He went to urgent care and had CAT scan completed at that time.  Was told he had kidney stone in his left kidney.  He was started on Flomax as well as Zofran and oxycodone.  Patient reports that the pain persisted and worsened progressively until this morning which prompted him to come to care.  Today patient reports pain is sharp left flank pain with radiation to his left groin.  The pain is constant and associated with nausea.  He has had 1 episode of emesis yesterday but nothing today.  Patient denies fever, chills, changes to urination including painful urination, urgency, frequency.  Patient also denies chest pain and shortness of breath.     Of note patient has a history of stones which he has passed spontaneously approximately 1 year ago.  He is established with urology [place] because of this prior episode.  These stones were found to be calcium oxalate stones.     ERCourse:  In ED patient afebrile, regular pulse, normotensive and saturating well in room air     CBC without abnormality  BMP with elevated creatinine to 2.21 and estimated GFR of 37 increased from baseline  UA with trace ketones without blood  CT renal colic with 7 " "mm obstructive stone in the left ureter  Evidence of fatty liver  Evidence of prior abdominal surgery     Patient received 100 mcg of fentanyl in the ED.  Urology was consulted and recommended n.p.o. with plans for possible lithotripsy this afternoon \"    HOSPITAL COURSE:    Patient was admitted for left ureteral obstructing stone.  His pain was adequately controlled and he underwent left ureteral stent placement successfully on April 23, 2024.  Morning of April 24 the patient pain was well-controlled and creatinine was improved.  Patient was discharged to home with follow-up in 1 week with urology for removal of stent and creatinine monitoring.    Therefore, he is discharged in good and stable condition to home with close outpatient follow-up.    The patient recovered much more quickly than anticipated on admission.    Discharge Date  4/24/2024    Physical Exam on Day of Discharge  Physical Exam  Constitutional:       General: He is not in acute distress.     Appearance: Normal appearance. He is not ill-appearing or toxic-appearing.   HENT:      Head: Normocephalic and atraumatic.      Right Ear: External ear normal.      Left Ear: External ear normal.      Nose: Nose normal. No congestion.      Mouth/Throat:      Mouth: Mucous membranes are moist.      Pharynx: No oropharyngeal exudate.   Eyes:      General: No scleral icterus.     Extraocular Movements: Extraocular movements intact.   Cardiovascular:      Rate and Rhythm: Normal rate and regular rhythm.      Pulses: Normal pulses.      Heart sounds: No murmur heard.  Pulmonary:      Effort: Pulmonary effort is normal.      Breath sounds: Normal breath sounds.   Abdominal:      General: Abdomen is flat. Bowel sounds are normal.      Palpations: Abdomen is soft.      Tenderness: There is no abdominal tenderness. There is no right CVA tenderness, left CVA tenderness, guarding or rebound.   Musculoskeletal:         General: Normal range of motion.      Cervical back: " Normal range of motion. No rigidity.      Right lower leg: No edema.      Left lower leg: No edema.   Skin:     General: Skin is warm.      Capillary Refill: Capillary refill takes less than 2 seconds.      Coloration: Skin is not jaundiced.   Neurological:      General: No focal deficit present.      Mental Status: He is alert and oriented to person, place, and time.   Psychiatric:         Mood and Affect: Mood normal.         Behavior: Behavior normal.         FOLLOW UP ITEMS POST DISCHARGE  Resolution of JUAN CARLOS     DISCHARGE DIAGNOSES  Principal Problem:    Obstructive uropathy (POA: Yes)  Active Problems:    Exercise-induced asthma (POA: Yes)    Fatty liver (POA: Unknown)  Resolved Problems:    * No resolved hospital problems. *      FOLLOW UP  No future appointments.  No follow-up provider specified.    MEDICATIONS ON DISCHARGE     Medication List        CONTINUE taking these medications        Instructions   albuterol 108 (90 Base) MCG/ACT Aers inhalation aerosol   Inhale 2 Puffs by mouth every 6 hours as needed for Shortness of Breath.  Dose: 2 Puff     ondansetron 4 MG Tbdp  Commonly known as: Zofran ODT   Take 1 Tablet by mouth every 6 hours as needed for Nausea.  Dose: 4 mg     oxyCODONE immediate-release 5 MG Tabs  Commonly known as: Roxicodone   Take 5 mg by mouth every 6 hours as needed for Severe Pain. Up to 3 days  Dose: 5 mg     tamsulosin 0.4 MG capsule  Commonly known as: Flomax   Take 1 Capsule by mouth every day.  Dose: 0.4 mg              Allergies  No Known Allergies    DIET  Orders Placed This Encounter   Procedures    Diet Order Diet: Clear Liquid     Standing Status:   Standing     Number of Occurrences:   1     Order Specific Question:   Diet:     Answer:   Clear Liquid [10]       ACTIVITY  As tolerated.  Weight bearing as tolerated    CONSULTATIONS  Urology    PROCEDURES  Left flexible ureteroscopy with lithotripsy  Left ureteral stent placement    LABORATORY  Lab Results   Component Value  Date    SODIUM 135 04/24/2024    POTASSIUM 5.1 04/24/2024    CHLORIDE 100 04/24/2024    CO2 24 04/24/2024    GLUCOSE 105 (H) 04/24/2024    BUN 16 04/24/2024    CREATININE 1.88 (H) 04/24/2024        Lab Results   Component Value Date    WBC 5.7 04/24/2024    HEMOGLOBIN 14.8 04/24/2024    HEMATOCRIT 40.9 (L) 04/24/2024    PLATELETCT 178 04/24/2024        Total time of the discharge process exceeds 20 minutes.

## 2024-04-25 ENCOUNTER — TELEPHONE (OUTPATIENT)
Dept: HEALTH INFORMATION MANAGEMENT | Facility: OTHER | Age: 44
End: 2024-04-25
Payer: COMMERCIAL

## 2024-06-07 ENCOUNTER — HOSPITAL ENCOUNTER (OUTPATIENT)
Dept: RADIOLOGY | Facility: MEDICAL CENTER | Age: 44
End: 2024-06-07
Attending: PHYSICIAN ASSISTANT
Payer: COMMERCIAL

## 2024-06-07 DIAGNOSIS — N20.0 CALCULUS OF KIDNEY: ICD-10-CM

## 2024-06-07 PROCEDURE — 74018 RADEX ABDOMEN 1 VIEW: CPT

## 2024-06-20 ENCOUNTER — HOSPITAL ENCOUNTER (OUTPATIENT)
Facility: MEDICAL CENTER | Age: 44
End: 2024-06-20
Attending: UROLOGY
Payer: COMMERCIAL

## 2024-06-20 LAB
ALBUMIN SERPL BCP-MCNC: 4 G/DL (ref 3.2–4.9)
ALBUMIN SERPL BCP-MCNC: 4.1 G/DL (ref 3.2–4.9)
ALBUMIN/GLOB SERPL: 1.3 G/DL
ALBUMIN/GLOB SERPL: 1.3 G/DL
ALP SERPL-CCNC: 82 U/L (ref 30–99)
ALP SERPL-CCNC: 86 U/L (ref 30–99)
ALT SERPL-CCNC: 18 U/L (ref 2–50)
ALT SERPL-CCNC: 20 U/L (ref 2–50)
ANION GAP SERPL CALC-SCNC: 13 MMOL/L (ref 7–16)
ANION GAP SERPL CALC-SCNC: 14 MMOL/L (ref 7–16)
AST SERPL-CCNC: 20 U/L (ref 12–45)
AST SERPL-CCNC: 24 U/L (ref 12–45)
BILIRUB SERPL-MCNC: 0.3 MG/DL (ref 0.1–1.5)
BILIRUB SERPL-MCNC: 0.3 MG/DL (ref 0.1–1.5)
BUN SERPL-MCNC: 13 MG/DL (ref 8–22)
BUN SERPL-MCNC: 14 MG/DL (ref 8–22)
CALCIUM ALBUM COR SERPL-MCNC: 9.1 MG/DL (ref 8.5–10.5)
CALCIUM ALBUM COR SERPL-MCNC: 9.2 MG/DL (ref 8.5–10.5)
CALCIUM SERPL-MCNC: 9.2 MG/DL (ref 8.5–10.5)
CALCIUM SERPL-MCNC: 9.2 MG/DL (ref 8.5–10.5)
CHLORIDE SERPL-SCNC: 103 MMOL/L (ref 96–112)
CHLORIDE SERPL-SCNC: 103 MMOL/L (ref 96–112)
CO2 SERPL-SCNC: 23 MMOL/L (ref 20–33)
CO2 SERPL-SCNC: 23 MMOL/L (ref 20–33)
CREAT SERPL-MCNC: 1.2 MG/DL (ref 0.5–1.4)
CREAT SERPL-MCNC: 1.25 MG/DL (ref 0.5–1.4)
GFR SERPLBLD CREATININE-BSD FMLA CKD-EPI: 73 ML/MIN/1.73 M 2
GFR SERPLBLD CREATININE-BSD FMLA CKD-EPI: 77 ML/MIN/1.73 M 2
GLOBULIN SER CALC-MCNC: 3.1 G/DL (ref 1.9–3.5)
GLOBULIN SER CALC-MCNC: 3.1 G/DL (ref 1.9–3.5)
GLUCOSE SERPL-MCNC: 87 MG/DL (ref 65–99)
GLUCOSE SERPL-MCNC: 89 MG/DL (ref 65–99)
POTASSIUM SERPL-SCNC: 4.2 MMOL/L (ref 3.6–5.5)
POTASSIUM SERPL-SCNC: 4.3 MMOL/L (ref 3.6–5.5)
PROT SERPL-MCNC: 7.1 G/DL (ref 6–8.2)
PROT SERPL-MCNC: 7.2 G/DL (ref 6–8.2)
PTH-INTACT SERPL-MCNC: 37.8 PG/ML (ref 14–72)
SODIUM SERPL-SCNC: 139 MMOL/L (ref 135–145)
SODIUM SERPL-SCNC: 140 MMOL/L (ref 135–145)
URATE SERPL-MCNC: 7.2 MG/DL (ref 2.5–8.3)

## 2024-06-20 PROCEDURE — 83970 ASSAY OF PARATHORMONE: CPT

## 2024-06-20 PROCEDURE — 84550 ASSAY OF BLOOD/URIC ACID: CPT

## 2024-06-20 PROCEDURE — 80053 COMPREHEN METABOLIC PANEL: CPT

## 2024-06-20 PROCEDURE — 80053 COMPREHEN METABOLIC PANEL: CPT | Mod: 91

## 2024-06-29 ENCOUNTER — OFFICE VISIT (OUTPATIENT)
Dept: URGENT CARE | Facility: PHYSICIAN GROUP | Age: 44
End: 2024-06-29
Payer: COMMERCIAL

## 2024-06-29 VITALS
TEMPERATURE: 97.7 F | WEIGHT: 188.27 LBS | RESPIRATION RATE: 16 BRPM | HEIGHT: 69 IN | BODY MASS INDEX: 27.89 KG/M2 | SYSTOLIC BLOOD PRESSURE: 116 MMHG | OXYGEN SATURATION: 97 % | HEART RATE: 68 BPM | DIASTOLIC BLOOD PRESSURE: 82 MMHG

## 2024-06-29 DIAGNOSIS — J02.9 PHARYNGITIS, UNSPECIFIED ETIOLOGY: ICD-10-CM

## 2024-06-29 DIAGNOSIS — J06.9 UPPER RESPIRATORY INFECTION, ACUTE: ICD-10-CM

## 2024-06-29 LAB — S PYO DNA SPEC NAA+PROBE: NOT DETECTED

## 2024-06-29 ASSESSMENT — ENCOUNTER SYMPTOMS
WEIGHT LOSS: 0
CHILLS: 0
BACK PAIN: 0
VOMITING: 0
SPUTUM PRODUCTION: 0
HEMOPTYSIS: 0
SORE THROAT: 1
COUGH: 1
SWOLLEN GLANDS: 0
EYE PAIN: 0
MYALGIAS: 0
EYE DISCHARGE: 0
SHORTNESS OF BREATH: 0
FEVER: 0
SINUS PAIN: 1

## 2024-06-29 ASSESSMENT — FIBROSIS 4 INDEX: FIB4 SCORE: 1.08

## 2024-06-29 NOTE — PROGRESS NOTES
"Subjective:   [place] Sharp is a 43 y.o. male who presents for Pharyngitis (Sore throat, cough X 2 days )      Presents with complaints of sore throat, congestion, cough 2 days.  He is present with his 4 children who are also having similar symptoms.  Positive sick contact with known strep infection.  He denies any difficulty breathing, fever, chills, or bodyaches.  Denies any significant past medical history    Pharyngitis   This is a new problem. The current episode started in the past 7 days. The problem has been gradually worsening. Neither side of throat is experiencing more pain than the other. There has been no fever. Associated symptoms include congestion, coughing and a plugged ear sensation. Pertinent negatives include no ear discharge, ear pain, shortness of breath, swollen glands or vomiting. He has had exposure to strep. He has had no exposure to mono. He has tried nothing for the symptoms.       Review of Systems   Constitutional:  Negative for chills, fever, malaise/fatigue and weight loss.   HENT:  Positive for congestion, sinus pain and sore throat. Negative for ear discharge and ear pain.    Eyes:  Negative for pain and discharge.   Respiratory:  Positive for cough. Negative for hemoptysis, sputum production and shortness of breath.    Cardiovascular:  Negative for chest pain.   Gastrointestinal:  Negative for vomiting.   Musculoskeletal:  Negative for back pain, joint pain and myalgias.   Skin:  Negative for rash.       Medications, Allergies, and current problem list reviewed today in Epic.     Objective:     /82 (BP Location: Left arm, Patient Position: Sitting, BP Cuff Size: Adult)   Pulse 68   Temp 36.5 °C (97.7 °F) (Temporal)   Resp 16   Ht 1.753 m (5' 9\")   Wt 85.4 kg (188 lb 4.4 oz)   SpO2 97%     Physical Exam  Constitutional:       General: He is not in acute distress.     Appearance: Normal appearance.   HENT:      Head: Normocephalic.      Right Ear: Tympanic membrane, ear " canal and external ear normal.      Left Ear: Tympanic membrane, ear canal and external ear normal.      Nose: Congestion present. No rhinorrhea.      Mouth/Throat:      Mouth: Mucous membranes are dry.      Pharynx: Posterior oropharyngeal erythema present. No oropharyngeal exudate.      Tonsils: No tonsillar exudate or tonsillar abscesses. 1+ on the right. 1+ on the left.     Eyes:      Conjunctiva/sclera: Conjunctivae normal.   Cardiovascular:      Rate and Rhythm: Normal rate.   Pulmonary:      Effort: Pulmonary effort is normal. No respiratory distress.      Breath sounds: Normal breath sounds. No stridor. No wheezing, rhonchi or rales.   Chest:      Chest wall: No tenderness.   Musculoskeletal:      Cervical back: Normal range of motion. No tenderness.   Lymphadenopathy:      Cervical: No cervical adenopathy.   Neurological:      Mental Status: He is alert.         Assessment/Plan:     Diagnosis and associated orders:     1. Pharyngitis, unspecified etiology  POCT [employer]PHE[employer] A STREP - PCR         Comments/MDM:     Patient's physical exam and presentation are consistent with his reported symptoms, likely viral upper respiratory infection pharyngitis  Given the patient has positive sick contacts rapid strep test done in clinic was negative, center score is low at a 2, and patient does not present as typical streptococcal pharyngitis.  Watchful waiting at this time with supportive treatment to treat symptoms.  Increase hydration with low sugar fluids.  Tylenol/ibuprofen staggered and round-the-clock.           Differential diagnosis, natural history, supportive care, and indications for immediate follow-up discussed.    Advised the patient to follow-up with the primary care physician for recheck, reevaluation, and consideration of further management.    Please note that this dictation was created using voice recognition software. I have made a reasonable attempt to correct obvious errors, but I expect that  there are errors of grammar and possibly content that I did not discover before finalizing the note.    This note was electronically signed by FABIAN Varela

## (undated) DEVICE — CONTAINER SPECIMEN BAG OR - STERILE 4 OZ W/LID (100EA/CA)

## (undated) DEVICE — SLEEVE, VASO, THIGH, MED

## (undated) DEVICE — GOWN WARMING STANDARD FLEX - (30/CA)

## (undated) DEVICE — GLOVE BIOGEL PI INDICATOR SZ 6.0 SURGICAL PF LF -(200PR/CA)

## (undated) DEVICE — TUBING CLEARLINK DUO-VENT - C-FLO (48EA/CA)

## (undated) DEVICE — FIBER LASER MOSES 200 UM (1/EA)

## (undated) DEVICE — LACTATED RINGERS INJ 1000 ML - (14EA/CA 60CA/PF)

## (undated) DEVICE — CONNECTOR HOSE NEPTUNE FOR CYSTO ROOM

## (undated) DEVICE — SET EXTENSION WITH 2 PORTS (48EA/CA) ***PART #2C8610 IS A SUBSTITUTE*****

## (undated) DEVICE — SCOPE DIGITAL URETEROSCOPE DISPOSABLE (10EA/PK)

## (undated) DEVICE — SUCTION INSTRUMENT YANKAUER BULBOUS TIP W/O VENT (50EA/CA)

## (undated) DEVICE — SHEATH ACCESS URETERAL NAVIGATOR HD STAINLESS STEEL HYDROPHILIC L36 CM OD11-13 FR (1EA)

## (undated) DEVICE — WIRE GUIDE SENSOR DUAL FLEX - 5/BX

## (undated) DEVICE — CANISTER SUCTION 3000ML MECHANICAL FILTER AUTO SHUTOFF MEDI-VAC NONSTERILE LF DISP  (40EA/CA)

## (undated) DEVICE — SET LEADWIRE 5 LEAD BEDSIDE DISPOSABLE ECG (1SET OF 5/EA)

## (undated) DEVICE — WATER IRRIG. STER 3000 ML - (4/CA)

## (undated) DEVICE — COVER FOOT UNIVERSAL DISP. - (25EA/CA)

## (undated) DEVICE — SENSOR OXIMETER ADULT SPO2 RD SET (20EA/BX)

## (undated) DEVICE — GLOVE BIOGEL SZ 7.5 SURGICAL PF LTX - (50PR/BX 4BX/CA)

## (undated) DEVICE — COVER LIGHT HANDLE ALC PLUS DISP (18EA/BX)

## (undated) DEVICE — WATER IRRIGATION STERILE 1000ML (12EA/CA)

## (undated) DEVICE — PACK CYSTO III (2EA/CA)

## (undated) DEVICE — SPONGE GAUZESTER 4 X 4 4PLY - (128PK/CA)

## (undated) DEVICE — SODIUM CHL. IRRIGATION 0.9% 3000ML (4EA/CA 65CA/PF)

## (undated) DEVICE — BAG URODRAIN WITH TUBING - (20/CA)